# Patient Record
Sex: FEMALE | Race: WHITE | NOT HISPANIC OR LATINO | Employment: FULL TIME | URBAN - METROPOLITAN AREA
[De-identification: names, ages, dates, MRNs, and addresses within clinical notes are randomized per-mention and may not be internally consistent; named-entity substitution may affect disease eponyms.]

---

## 2016-02-17 LAB
HBV SURFACE AG SERPL QL IA: NEGATIVE
HIV 1+2 AB+HIV1 P24 AG SERPL QL IA: NEGATIVE
RUBELLA ANTIBODY IGG QUANTITATIVE: NORMAL IU/ML

## 2017-01-06 ASSESSMENT — ENCOUNTER SYMPTOMS
SPEECH CHANGE: 0
PARALYSIS: 0
LIGHT-HEADEDNESS: 1
FEVER: 0
SMELL DISTURBANCE: 0
WEIGHT GAIN: 0
INSOMNIA: 1
JOINT SWELLING: 0
HYPOTENSION: 0
BLOOD IN STOOL: 0
FATIGUE: 1
TREMORS: 0
POOR WOUND HEALING: 0
HEADACHES: 1
CONSTIPATION: 1
LEG PAIN: 0
POLYPHAGIA: 1
NECK MASS: 0
NIGHT SWEATS: 0
VOMITING: 0
LOSS OF CONSCIOUSNESS: 0
RECTAL PAIN: 0
SKIN CHANGES: 0
NAUSEA: 1
SINUS CONGESTION: 1
DISTURBANCES IN COORDINATION: 0
JAUNDICE: 0
DECREASED CONCENTRATION: 1
BOWEL INCONTINENCE: 0
SEIZURES: 0
HYPERTENSION: 0
HEARTBURN: 0
LEG SWELLING: 0
NUMBNESS: 0
TINGLING: 0
DIZZINESS: 1
SYNCOPE: 0
SLEEP DISTURBANCES DUE TO BREATHING: 0
ORTHOPNEA: 0
STIFFNESS: 0
CHILLS: 0
RECTAL BLEEDING: 0
MUSCLE WEAKNESS: 0
DECREASED APPETITE: 1
CLAUDICATION: 0
DEPRESSION: 1
EXERCISE INTOLERANCE: 0
NERVOUS/ANXIOUS: 1
WEIGHT LOSS: 1
BACK PAIN: 1
HALLUCINATIONS: 0
MYALGIAS: 0
SORE THROAT: 0
PANIC: 1
TACHYCARDIA: 0
BLOATING: 1
SINUS PAIN: 0
TASTE DISTURBANCE: 0
NAIL CHANGES: 0
MEMORY LOSS: 0
PALPITATIONS: 1
HOARSE VOICE: 0
ARTHRALGIAS: 1
WEAKNESS: 0
TROUBLE SWALLOWING: 0
NECK PAIN: 1
POLYDIPSIA: 1
INCREASED ENERGY: 1
DIARRHEA: 1
ABDOMINAL PAIN: 1
ALTERED TEMPERATURE REGULATION: 0
MUSCLE CRAMPS: 0

## 2017-01-06 ASSESSMENT — PATIENT HEALTH QUESTIONNAIRE - PHQ9
10. IF YOU CHECKED OFF ANY PROBLEMS, HOW DIFFICULT HAVE THESE PROBLEMS MADE IT FOR YOU TO DO YOUR WORK, TAKE CARE OF THINGS AT HOME, OR GET ALONG WITH OTHER PEOPLE: SOMEWHAT DIFFICULT
SUM OF ALL RESPONSES TO PHQ QUESTIONS 1-9: 14

## 2017-01-06 ASSESSMENT — ANXIETY QUESTIONNAIRES
GAD7 TOTAL SCORE: 17
GAD7 TOTAL SCORE: 17
7. FEELING AFRAID AS IF SOMETHING AWFUL MIGHT HAPPEN: 2 = MORE THAN HALF THE DAYS

## 2017-01-07 ASSESSMENT — ANXIETY QUESTIONNAIRES: GAD7 TOTAL SCORE: 17

## 2017-01-07 ASSESSMENT — PATIENT HEALTH QUESTIONNAIRE - PHQ9: SUM OF ALL RESPONSES TO PHQ QUESTIONS 1-9: 14

## 2017-01-16 ENCOUNTER — OFFICE VISIT (OUTPATIENT)
Dept: HEMATOLOGY | Facility: CLINIC | Age: 27
End: 2017-01-16
Attending: INTERNAL MEDICINE
Payer: COMMERCIAL

## 2017-01-16 VITALS
HEART RATE: 96 BPM | DIASTOLIC BLOOD PRESSURE: 75 MMHG | SYSTOLIC BLOOD PRESSURE: 115 MMHG | WEIGHT: 124.3 LBS | HEIGHT: 67 IN | TEMPERATURE: 98.3 F | BODY MASS INDEX: 19.51 KG/M2

## 2017-01-16 DIAGNOSIS — D68.59: Primary | ICD-10-CM

## 2017-01-16 DIAGNOSIS — D68.59: ICD-10-CM

## 2017-01-16 LAB
AT III ACT/NOR PPP CHRO: 92 % (ref 85–135)
HCG SERPL QL: POSITIVE

## 2017-01-16 PROCEDURE — 81240 F2 GENE: CPT | Performed by: INTERNAL MEDICINE

## 2017-01-16 PROCEDURE — 99213 OFFICE O/P EST LOW 20 MIN: CPT

## 2017-01-16 PROCEDURE — 99215 OFFICE O/P EST HI 40 MIN: CPT | Performed by: INTERNAL MEDICINE

## 2017-01-16 PROCEDURE — 85306 CLOT INHIBIT PROT S FREE: CPT | Performed by: INTERNAL MEDICINE

## 2017-01-16 PROCEDURE — 84703 CHORIONIC GONADOTROPIN ASSAY: CPT | Performed by: INTERNAL MEDICINE

## 2017-01-16 PROCEDURE — 85300 ANTITHROMBIN III ACTIVITY: CPT | Performed by: INTERNAL MEDICINE

## 2017-01-16 PROCEDURE — 36415 COLL VENOUS BLD VENIPUNCTURE: CPT | Performed by: INTERNAL MEDICINE

## 2017-01-16 ASSESSMENT — PAIN SCALES - GENERAL: PAINLEVEL: NO PAIN (0)

## 2017-01-16 NOTE — PATIENT INSTRUCTIONS
Your visit the [facility] was to discuss Protein C deficinecy.  You recent blood testing does confirm that you are heterozygous (meaning inherited a Protein C defect from 1 parent) for Protein C deficiency.   This means about half of the Protein C is functioning correctly in your blood.  This increases your risk for a blood clot and/or miscarriage but only to a small degree so we generally do not recommend blood thinners for this.  If you have inherited another tendency to form blood clots inherited from your father, then we would recommend considering a low-dose blood thinner during pregnancy.  We will call you the results of today's lab tests.

## 2017-01-16 NOTE — PROGRESS NOTES
[facility]  [address] [place] [address] [address], [address] [address]  Phone: [phone], Fax: [phone].      Outpatient Visit Note:    Patient: Ofelia Huddleston  MRN: [id number]  : 1990  GÓMEZ: 2017    Reason for Visit:  Protein C deficiency, never had a VTE    History of Present Illness:  Ofelia Huddleston is a 26 year old woman with a history of Protein C deficiency (carrier) who returns for routine follow up.  She was seen in this clinic in  for counseling about Protein C deficiency.  She was tested because a maternal aunt was found to have PC deficiency - though the reason for this testing is unclear.  Because of this test, her mother was then tested and found to be heterozygous PC deficient as well.  Her mother has never had a clot and Ofelia is unaware of fertility issues in mother.  Ofelia returns today feeling well.  Her protein C level was initially tested while OCPs and found to be 59%.  She is now off OCPs and had repeat testing in November and found to be 44%.  She also had FVLeiden testing which was negative.    She reports today that she is returning primarily because she is attempting to conceive.  She is feeling well today.  Today she brings in a lab report she got as part of testing for life insurance.  This show mild elevations of liver tests -  (ULN 33) and  (ULN 45).  She notes no abdominal fullness or pain.  She is requesting a serum pregnancy test today.    Past Medical History:  Past Medical History   Diagnosis Date     NO ACTIVE PROBLEMS (aka NONE)      Heterozygous protein C deficiency (H) 9/10/2014       Past Surgical History:  Past Surgical History   Procedure Laterality Date     Tonsillectomy         Medications:  Current Outpatient Prescriptions   Medication Sig Dispense Refill     Lactobacillus (PROBIOTIC ACIDOPHILUS) TABS        Pyridoxine HCl (VITAMIN B-6 PO)        UNABLE TO FIND MEDICATION NAME:  Chinese herbal supplements       Prenatal Vit-Fe Fumarate-FA (PRENATAL MULTIVITAMIN  PLUS IRON) 27-0.8 MG TABS Take 1 tablet by mouth daily       calcium-magnesium (CALMAG) 500-250 MG TABS Take 1 tablet by mouth daily       mometasone (NASONEX) 50 MCG/ACT nasal spray Spray 2 sprays into both nostrils daily 3 Box 3     loratadine (CLARITIN) 10 MG tablet Take 10 mg by mouth daily          Allergies:  Allergies   Allergen Reactions     Seasonal Allergies        ROS:  A 14 point ROS is negative except as stated in the HPI    Social History:  Denies any tobacco use. No significant alcohol use. Denies any illicit drug use. Patient works as an attoryney.    Family History:  As per HPI, mother has PC def (het) and maternal aunt does as well (het).    Objective:  Vitals: B/P: 115/75, T: 98.3, P: 96, R: Data Unavailable, Wt: 124 lbs 4.8 oz  Exam:   Gen: Appears well, no distress  HEENT: no scleral icterus or hemorrhage, no wet purpura, no lymphadenopathy  Abd: no fullness or distention  Ext: no edema    Labs:  As per HPI recent PC testing shows 44% activity    Imaging:  none    Assessment:  In summary, Ofelia Huddleston is a 26 year old woman with heterozygosity for Protein C deficiency, attempting to conceive.    Plan:  1. Test for other inherited thrombophilias  2. No anticoagulation right now but would consider prophylactic dose LMWH if she has co-inherited other thrombophilias.  3. Consider Mirena IUD or nonhormonal birth control post pregnancy.  4. Liver function test abnormalities - no further action, no related to protein C deficiency.    The patient is given our center's contact information and is instructed to call if she should have any further questions or concerns.  Otherwise, we will plan on seeing her back on an as-needed basis.      Total Time Spent:  I spent a total of 45 minutes face-to-face with Ofelia Huddleston during today's office visit.  Over 50% of this time was spent counseling the patient and/or  coordinating care regarding Protein C deficiency.      Elmo Paris MD   of Medicine  [facility] [employer]

## 2017-01-16 NOTE — LETTER
[facility]  [address] [place] [address], [facility] [address], [place] [address]  Phone: [phone], Fax: [phone].      To Whom it May Concern,        Ofelia Huddleston was seen at the [facility] for evaluation of risk for thrombosis during pregnancy.  As part of this evaluation she presented recent laboratory data indicating the presence of a mild elevations of liver tests.  Repeating those tests in my clinic, this incidental finding appears to be resolved.  Regardless, the cause for mildly elevated liver tests is not related to her inherited and generally thought to be mildly increased lifetime risk of venous thrombosis from heterozygosity of Protein C deficiency.         Signed,    Elmo Paris MD   of Medicine  [facility] [employer]

## 2017-01-19 LAB — PROT S FREE AG ACT/NOR PPP IA: 81 % (ref 55–125)

## 2017-01-20 ENCOUNTER — MYC MEDICAL ADVICE (OUTPATIENT)
Dept: FAMILY MEDICINE | Facility: CLINIC | Age: 27
End: 2017-01-20

## 2017-01-20 DIAGNOSIS — Z32.01 PREGNANCY TEST POSITIVE: Primary | ICD-10-CM

## 2017-01-20 DIAGNOSIS — N92.6 IRREGULAR MENSTRUAL CYCLE: ICD-10-CM

## 2017-01-20 LAB — COPATH REPORT: NORMAL

## 2017-01-20 NOTE — TELEPHONE ENCOUNTER
"Would advise patient put same request to OB.  Typically preferred that these type of labs are drawn from the same facility if need for repeat progesterone levels in the future and for better accuracy and follow up.  OB may be willing to du lab only prior to 8 week apt.  Thanks  Charissa \"Rafa\" EVERARDO Kaufman   "

## 2017-01-25 NOTE — NURSING NOTE
"Chief Complaint   Patient presents with     Consult     consult with protein C deficiency, tzimmer cma       Initial /75 mmHg  Pulse 96  Temp(Src) 98.3  F (36.8  C) (Oral)  Ht 1.702 m (5' 7\")  Wt 56.382 kg (124 lb 4.8 oz)  BMI 19.46 kg/m2 Estimated body mass index is 19.46 kg/(m^2) as calculated from the following:    Height as of this encounter: 1.702 m (5' 7\").    Weight as of this encounter: 56.382 kg (124 lb 4.8 oz).  BP completed using cuff size: regular    " none

## 2017-02-06 ENCOUNTER — MYC MEDICAL ADVICE (OUTPATIENT)
Dept: HEMATOLOGY | Facility: CLINIC | Age: 27
End: 2017-02-06

## 2017-02-07 ENCOUNTER — OFFICE VISIT (OUTPATIENT)
Dept: FAMILY MEDICINE | Facility: CLINIC | Age: 27
End: 2017-02-07
Payer: COMMERCIAL

## 2017-02-07 VITALS
HEIGHT: 67 IN | SYSTOLIC BLOOD PRESSURE: 96 MMHG | TEMPERATURE: 98 F | DIASTOLIC BLOOD PRESSURE: 50 MMHG | OXYGEN SATURATION: 99 % | BODY MASS INDEX: 19.85 KG/M2 | WEIGHT: 126.5 LBS | HEART RATE: 99 BPM

## 2017-02-07 DIAGNOSIS — J00 ACUTE NASOPHARYNGITIS: Primary | ICD-10-CM

## 2017-02-07 DIAGNOSIS — Z33.1 PREGNANCY, INCIDENTAL: ICD-10-CM

## 2017-02-07 PROCEDURE — 99213 OFFICE O/P EST LOW 20 MIN: CPT | Performed by: FAMILY MEDICINE

## 2017-02-07 NOTE — NURSING NOTE
"Chief Complaint   Patient presents with     URI       Initial BP 96/50 mmHg  Pulse 99  Temp(Src) 98  F (36.7  C) (Oral)  Ht 5' 7\" (1.702 m)  Wt 126 lb 8 oz (57.38 kg)  BMI 19.81 kg/m2  SpO2 99%  LMP 10/20/2016  Breastfeeding? Yes Estimated body mass index is 19.81 kg/(m^2) as calculated from the following:    Height as of this encounter: 5' 7\" (1.702 m).    Weight as of this encounter: 126 lb 8 oz (57.38 kg).  Medication Reconciliation: complete     Natasha Paris, SMA    "

## 2017-02-07 NOTE — PROGRESS NOTES
"  SUBJECTIVE:                                                    Ofelia Huddleston is a 26 year old female who presents to clinic today for the following health issues:      RESPIRATORY SYMPTOMS      Duration: a week and a half    Description  nasal congestion, rhinorrhea, chills, fatigue/malaise and green mucus     Severity: not sure    Accompanying signs and symptoms: None    History (predisposing factors):  Exposed at work, many coworkers    Precipitating or alleviating factors: None    Therapies tried and outcome:  nothing        Allergies   Allergen Reactions     Seasonal Allergies       Past Surgical History   Procedure Laterality Date     Tonsillectomy  2006     Past Medical History   Diagnosis Date     NO ACTIVE PROBLEMS (aka NONE)      Heterozygous protein C deficiency (H) 9/10/2014        \BP 96/50 mmHg  Pulse 99  Temp(Src) 98  F (36.7  C) (Oral)  Ht 5' 7\" (1.702 m)  Wt 126 lb 8 oz (57.38 kg)  BMI 19.81 kg/m2  SpO2 99%  LMP 10/20/2016  Breastfeeding? Yes   OBJECTIVE:  She appears tired vital signs are as noted by the nurse, normal. Ears normal.  Throat and pharynx normal.  Neck supple. No adenopathy in the neck. Nose is congested with clear drainage. Sinuses non tender. The chest is clear, without wheezes or rales.    ASSESSMENT:   Viral upper respiratory illness  Incidental pregnancy, she doesn't want to take any medications, discussed options, Please see patient instructions below.     PLAN:  Symptomatic therapy suggested: push fluids and rest. Call or return to clinic prn if these symptoms worsen or fail to improve as anticipated.   "

## 2017-02-07 NOTE — MR AVS SNAPSHOT
"              After Visit Summary   2/7/2017    Ofelia Huddleston    MRN: [id number]           Patient Information     Date Of Birth          1990        Visit Information        Provider Department      2/7/2017 3:20 PM Halina Luciano MD [facility]        Care Instructions    Vitamin C 250 - 500  Mg daily  Buckwheat honey  Hot toddies: half a lemon, tablespoon honey, hot water      B vitamins, hiwot can help nausea.        Follow-ups after your visit        Who to contact     If you have questions or need follow up information about today's clinic visit or your schedule please contact [facility] directly at [phone].  Normal or non-critical lab and imaging results will be communicated to you by MyChart, letter or phone within 4 business days after the clinic has received the results. If you do not hear from us within 7 days, please contact the clinic through [employer] or phone. If you have a critical or abnormal lab result, we will notify you by phone as soon as possible.  Submit refill requests through [employer]EDit or call your pharmacy and they will forward the refill request to us. Please allow 3 business days for your refill to be completed.          Additional Information About Your Visit        MyChart Information     SCHEDit gives you secure access to your electronic health record. If you see a primary care provider, you can also send messages to your care team and make appointments. If you have questions, please call your primary care clinic.  If you do not have a primary care provider, please call [phone] and they will assist you.        Care EveryWhere ID     This is your Care EveryWhere ID. This could be used by other organizations to access your [facility] medical records  [id number]        Your Vitals Were     Pulse Temperature Height BMI (Body Mass Index) Pulse Oximetry Last Period    99 98  F (36.7  C) (Oral) 5' 7\" (1.702 m) 19.81 kg/m2 99% 10/20/2016    " Breastfeeding?                   Yes            Blood Pressure from Last 3 Encounters:   02/07/17 96/50   01/16/17 115/75   11/10/16 100/58    Weight from Last 3 Encounters:   02/07/17 126 lb 8 oz (57.38 kg)   01/16/17 124 lb 4.8 oz (56.382 kg)   11/10/16 130 lb (58.968 kg)              Today, you had the following     No orders found for display       Primary Care Provider Office Phone # Fax #    Halina Luciano [phone]       [facility] [address]  [facility] [address]        Thank you!     Thank you for choosing [facility]  for your care. Our goal is always to provide you with excellent care. Hearing back from our patients is one way we can continue to improve our services. Please take a few minutes to complete the written survey that you may receive in the mail after your visit with us. Thank you!             Your Updated Medication List - Protect others around you: Learn how to safely use, store and throw away your medicines at www.disposemymeds.org.          This list is accurate as of: 2/7/17  4:05 PM.  Always use your most recent med list.                   Brand Name Dispense Instructions for use    calcium-magnesium 500-250 MG Tabs per tablet    CALMAG     Take 1 tablet by mouth daily       loratadine 10 MG tablet    CLARITIN     Take 10 mg by mouth daily       mometasone 50 MCG/ACT spray    NASONEX    3 Box    Spray 2 sprays into both nostrils daily       prenatal multivitamin  plus iron 27-0.8 MG Tabs per tablet      Take 1 tablet by mouth daily       PROBIOTIC ACIDOPHILUS Tabs          UNABLE TO FIND      MEDICATION NAME: Chinese herbal supplements       VITAMIN B-6 PO

## 2017-05-03 ENCOUNTER — THERAPY VISIT (OUTPATIENT)
Dept: PHYSICAL THERAPY | Facility: CLINIC | Age: 27
End: 2017-05-03
Payer: COMMERCIAL

## 2017-05-03 DIAGNOSIS — M54.50 ACUTE BILATERAL LOW BACK PAIN WITHOUT SCIATICA: Primary | ICD-10-CM

## 2017-05-03 DIAGNOSIS — Z33.1 PREGNANCY, INCIDENTAL: ICD-10-CM

## 2017-05-03 PROCEDURE — 97110 THERAPEUTIC EXERCISES: CPT | Mod: GP | Performed by: PHYSICAL THERAPIST

## 2017-05-03 PROCEDURE — 97161 PT EVAL LOW COMPLEX 20 MIN: CPT | Mod: GP | Performed by: PHYSICAL THERAPIST

## 2017-05-03 NOTE — LETTER
[facility]  [address]  [facility] [address]  [phone]    May 3, 2017    Re: Ofelia Huddleston   :   1990  MRN:  [id number]   REFERRING PHYSICIAN:   Hazel Cardoza    [facility]  Date of Initial Evaluation:  2017  Visits:  1 Rxs Used: 1  Reason for Referral:     Acute bilateral low back pain without sciatica  Pregnancy, incidental    EVALUATION SUMMARY  Subjective:  Patient is a 26 year old female presenting with rehab back hpi.   Ofelia Huddleston is a 26 year old female with a lumbar condition.  Condition occurred with:  Insidious onset.  Condition occurred: at home.  This is a new condition  Pt complains of LBP starting about 1 month ago (2017) for unknown reasons.  She is currently 19 weeks gestation, this is her 1st pregnancy and everything going well.  She has had LBP in past with sleeping, her job does require her to sit a lot ().  Sometimes complains of pain in L SIJ with walking but reports low back feels better with walking.  Denies any incontinence/bowel/bladder changes or weakness, denies any radicular symptoms..  Site of Pain: mid to low back, some SI pain/ L hip pain.    Pain is described as aching and sharp and is constant and reported as 5/10.  Associated symptoms:  Pregnancy. Pain is worse in the A.M. and worse in the P.M..  Symptoms are exacerbated by walking and sitting and relieved by activity/movement (got a support belt which helps a little).  Since onset symptoms are gradually worsening.    Previous treatment includes chiropractic (goes 1 x a week).  There was mild (adjustments) improvement following previous treatment.  General health as reported by patient is good.                Objective:  System  Lumbar/SI Evaluation  ROM:    AROM Lumbar:   Flexion:          Mod loss, decreased curve reversal  Ext:                    Mod loss   Side Bend:         Left:     Right:   Rotation:           Left:     Right:   Side Glide:        Left:  Mod loss    Right:  Mod loss  Lumbar Myotomes:  normal (does present with weakness in L glut/hip abduction 4/5 with min pain)  Lumbar DTR's:  normal  Cord Signs:  normal  Lumbar Dermtomes:  normal  Re: Ofelia Huddleston   :   1990    Lumbar Palpation:    Tenderness present at Left:    Piriformis and Gluteus Medius  Functional Tests:  Core strength and proprioception lumbar: Poor TrA activation with tendency to Valsalva.  SI joint/Sacrum:      Left positive at:    Squish and Thigh thrust  Left negative at:    Ilium Ventromedial  Assessment/Plan:    Patient is a 26 year old female with lumbar complaints.    Patient has the following significant findings with corresponding treatment plan.                Diagnosis 1:  LBP/pregnant  Pain -  manual therapy, self management, education and home program  Decreased ROM/flexibility - manual therapy, therapeutic exercise, therapeutic activity and home program  Decreased strength - therapeutic exercise, therapeutic activities and home program  Impaired muscle performance - neuro re-education and home program  Decreased function - therapeutic activities and home program  Impaired posture - neuro re-education, therapeutic activities and home program  Therapy Evaluation Codes:   1) History comprised of:   Personal factors that impact the plan of care:      None.    Comorbidity factors that impact the plan of care are:      Current pregnancy, Depression and Migraines/headaches.     Medications impacting care: None.  2) Examination of Body Systems comprised of:   Body structures and functions that impact the plan of care:      Lumbar spine.   Activity limitations that impact the plan of care are:      Sitting, Standing, Walking and Sleeping.  3) Clinical presentation characteristics are:   Stable/Uncomplicated.  4) Decision-Making    Low complexity using standardized patient assessment  instrument and/or measureable assessment of functional outcome.  Cumulative Therapy Evaluation is: Low complexity.  Previous and current functional limitations:  (See Goal Flow Sheet for this information)    Short term and Long term goals: (See Goal Flow Sheet for this information)   Communication ability:  Patient appears to be able to clearly communicate and understand verbal and written communication and follow directions correctly.  Treatment Explanation - The following has been discussed with the patient:   RX ordered/plan of care  Anticipated outcomes  Possible risks and side effects  This patient would benefit from PT intervention to resume normal activities.   Rehab potential is good.  Frequency:  1 X week, once daily  Re: Ofelia Huddleston   :   1990    Duration:  for 3 weeks tapering to 1 X a month over 2 months  Discharge Plan:  Achieve all LTG.  Independent in home treatment program.  Reach maximal therapeutic benefit.    Thank you for your referral.    INQUIRIES  Therapist:Jeb Oliver DPT  [facility]  [address] [place], [place] [address] [address]  Phone: [phone]  Fax: [phone]

## 2017-05-03 NOTE — PROGRESS NOTES
Subjective:    Patient is a 26 year old female presenting with rehab back hpi.   Ofelia Huddleston is a 26 year old female with a lumbar condition.  Condition occurred with:  Insidious onset.  Condition occurred: at home.  This is a new condition  Pt complains of LBP starting about 1 month ago (April 2017) for unknown reasons.  She is currently 19 weeks gestation, this is her 1st pregnancy and everything going well.  She has had LBP in past with sleeping, her job does require her to sit a lot ().  Sometimes complains of pain in L SIJ with walking but reports low back feels better with walking.  Denies any incontinence/bowel/bladder changes or weakness, denies any radicular symptoms..    Site of Pain: mid to low back, some SI pain/ L hip pain.    Pain is described as aching and sharp and is constant and reported as 5/10.  Associated symptoms:  Pregnancy. Pain is worse in the A.M. and worse in the P.M..  Symptoms are exacerbated by walking and sitting and relieved by activity/movement (got a support belt which helps a little).  Since onset symptoms are gradually worsening.    Previous treatment includes chiropractic (goes 1 x a week).  There was mild (adjustments) improvement following previous treatment.  General health as reported by patient is good.                                              Objective:    System         Lumbar/SI Evaluation  ROM:    AROM Lumbar:   Flexion:          Mod loss, decreased curve reversal  Ext:                    Mod loss   Side Bend:        Left:     Right:   Rotation:           Left:     Right:   Side Glide:        Left:  Mod loss    Right:  Mod loss          Lumbar Myotomes:  normal (does present with weakness in L glut/hip abduction 4/5 with min pain)            Lumbar DTR's:  normal      Cord Signs:  normal    Lumbar Dermtomes:  normal                  Lumbar Palpation:    Tenderness present at Left:    Piriformis and Gluteus Medius    Functional Tests:  Core strength and  proprioception lumbar: Poor TrA activation with tendency to Valsalva.            SI joint/Sacrum:        Left positive at:    Squish and Thigh thrust  Left negative at:    Ilium Ventromedial                                                   General     ROS    Assessment/Plan:      Patient is a 26 year old female with lumbar complaints.    Patient has the following significant findings with corresponding treatment plan.                Diagnosis 1:  LBP/pregnant  Pain -  manual therapy, self management, education and home program  Decreased ROM/flexibility - manual therapy, therapeutic exercise, therapeutic activity and home program  Decreased strength - therapeutic exercise, therapeutic activities and home program  Impaired muscle performance - neuro re-education and home program  Decreased function - therapeutic activities and home program  Impaired posture - neuro re-education, therapeutic activities and home program    Therapy Evaluation Codes:   1) History comprised of:   Personal factors that impact the plan of care:      None.    Comorbidity factors that impact the plan of care are:      Current pregnancy, Depression and Migraines/headaches.     Medications impacting care: None.  2) Examination of Body Systems comprised of:   Body structures and functions that impact the plan of care:      Lumbar spine.   Activity limitations that impact the plan of care are:      Sitting, Standing, Walking and Sleeping.  3) Clinical presentation characteristics are:   Stable/Uncomplicated.  4) Decision-Making    Low complexity using standardized patient assessment instrument and/or measureable assessment of functional outcome.  Cumulative Therapy Evaluation is: Low complexity.    Previous and current functional limitations:  (See Goal Flow Sheet for this information)    Short term and Long term goals: (See Goal Flow Sheet for this information)     Communication ability:  Patient appears to be able to clearly communicate and  understand verbal and written communication and follow directions correctly.  Treatment Explanation - The following has been discussed with the patient:   RX ordered/plan of care  Anticipated outcomes  Possible risks and side effects  This patient would benefit from PT intervention to resume normal activities.   Rehab potential is good.    Frequency:  1 X week, once daily  Duration:  for 3 weeks tapering to 1 X a month over 2 months  Discharge Plan:  Achieve all LTG.  Independent in home treatment program.  Reach maximal therapeutic benefit.    Please refer to the daily flowsheet for treatment today, total treatment time and time spent performing 1:1 timed codes.

## 2017-05-12 ENCOUNTER — THERAPY VISIT (OUTPATIENT)
Dept: PHYSICAL THERAPY | Facility: CLINIC | Age: 27
End: 2017-05-12
Payer: COMMERCIAL

## 2017-05-12 DIAGNOSIS — Z33.1 PREGNANCY, INCIDENTAL: ICD-10-CM

## 2017-05-12 DIAGNOSIS — M54.50 ACUTE BILATERAL LOW BACK PAIN WITHOUT SCIATICA: ICD-10-CM

## 2017-05-12 PROCEDURE — 97112 NEUROMUSCULAR REEDUCATION: CPT | Mod: GP | Performed by: PHYSICAL THERAPIST

## 2017-05-12 PROCEDURE — 97110 THERAPEUTIC EXERCISES: CPT | Mod: GP | Performed by: PHYSICAL THERAPIST

## 2017-05-15 ENCOUNTER — HOSPITAL ENCOUNTER (OUTPATIENT)
Facility: CLINIC | Age: 27
Discharge: HOME | End: 2017-05-15
Attending: OBSTETRICS & GYNECOLOGY | Admitting: OBSTETRICS & GYNECOLOGY
Payer: COMMERCIAL

## 2017-05-15 VITALS
WEIGHT: 132 LBS | TEMPERATURE: 97.9 F | DIASTOLIC BLOOD PRESSURE: 59 MMHG | BODY MASS INDEX: 20.72 KG/M2 | HEIGHT: 67 IN | SYSTOLIC BLOOD PRESSURE: 116 MMHG

## 2017-05-15 PROBLEM — Z34.90 PREGNANCY: Status: ACTIVE | Noted: 2017-05-15

## 2017-05-15 LAB — A1 MICROGLOB PLACENTAL VAG QL: NEGATIVE

## 2017-05-15 PROCEDURE — 99214 OFFICE O/P EST MOD 30 MIN: CPT

## 2017-05-15 PROCEDURE — 84112 EVAL AMNIOTIC FLUID PROTEIN: CPT | Performed by: OBSTETRICS & GYNECOLOGY

## 2017-05-15 PROCEDURE — 99214 OFFICE O/P EST MOD 30 MIN: CPT | Mod: 25

## 2017-05-15 NOTE — PLAN OF CARE
Primip 20+5 wks presents to [facility] at 1810, states she leaked a small amount of clear fluid on Saturday evening but has none since. Denies any cramping or UCs, denies any bleeding. [place] applied. Fht's 143 by Taylor. Pt states she has started to feel FM. No complications with this pregnancy so far. Amnisure obtained, no fluid seen. Discussed POC with pt and  who state understanding.

## 2017-05-15 NOTE — PLAN OF CARE
Amnisure negative, Dr. Thompson paged at 1845 and d/c order received. D/c instructions given to pt, call MD with any bleeding, LOF, decreased FM, contractions or cramping, or any other questions or concerns. Pt states understanding. D/c home walking at 1850.

## 2017-05-15 NOTE — IP AVS SNAPSHOT
MRN:[id number]                      After Visit Summary   5/15/2017    Ofelia Huddleston    MRN: [id number]           Thank you!     Thank you for choosing [facility] for your care. Our goal is always to provide you with excellent care. Hearing back from our patients is one way we can continue to improve our services. Please take a few minutes to complete the written survey that you may receive in the mail after you visit with us. Thank you!        Patient Information     Date Of Birth          1990        About your hospital stay     You were admitted on:  May 15, 2017 You last received care in the:  [facility]    You were discharged on:  May 15, 2017       Who to Call     For medical emergencies, please call 911.  For non-urgent questions about your medical care, please call your primary care provider or clinic, [phone]          Attending Provider     Provider Specialty    Teresa Thompson MD OB/Gyn       Primary Care Provider Office Phone # Fax #    Halina Luciano [phone]       [address] [address]        Your next 10 appointments already scheduled     May 17, 2017  8:20 AM CDT   [facility] For Women Only with Russ Do Scheamirah,    [facility] ([facility]  )    [address] [place] [address] [address]  [facility] [id number] [phone]            May 23, 2017  8:20 AM CDT   [facility] Women Only with Russ Do Benito, [facility] ([facility]  )    [address] [place] [address] [address]  [facility] [id number] [phone]              Further instructions from your care team       Discharge Instruction for Undelivered Patients      You were seen for: Membrane Assessment  We Consulted: Dr. Thompson  You had (Test or Medicine):Amnisure     Diet:   Drink 8 to 12 glasses of liquids (milk, juice, water) every day.  You may eat meals and snacks.    "  Activity:  Call your doctor or nurse midwife if your baby is moving less than usual.     Call your provider if you notice:  Swelling in your face or increased swelling in your hands or legs.  Headaches that are not relieved by Tylenol (acetaminophen).  Changes in your vision (blurring: seeing spots or stars.)  Nausea (sick to your stomach) and vomiting (throwing up).   Weight gain of 5 pounds or more per week.  Heartburn that doesn't go away.  Signs of bladder infection: pain when you urinate (use the toilet), need to go more often and more urgently.  The bag of sullivan (rupture of membranes) breaks, or you notice leaking in your underwear.  Bright red blood in your underwear.  Abdominal (lower belly) or stomach pain.  For first baby: Contractions (tightening) less than 5 minutes apart for one hour or more.  Second (plus) baby: Contractions (tightening) less than 10 minutes apart and getting stronger.  *If less than 34 weeks: Contractions (tightenings) more than 6 times in one hour.  Increase or change in vaginal discharge (note the color and amount)  Other:     Follow-up:  As scheduled in the clinic          Pending Results     Date and Time Order Name Status Description    5/15/2017 1812 Rupture of membranes by Amnisure In process             Admission Information     Date & Time Provider Department Dept. Phone    5/15/2017 Teresa Thompson MD [facility] [phone]      Your Vitals Were     Blood Pressure Temperature Height Weight Last Period BMI (Body Mass Index)    116/59 97.9  F (36.6  C) (Temporal) 1.702 m (5' 7\") 59.9 kg (132 lb) 10/20/2016 20.67 kg/m2      MyChart Information     [employer] gives you secure access to your electronic health record. If you see a primary care provider, you can also send messages to your care team and make appointments. If you have questions, please call your primary care clinic.  If you do not have a primary care provider, please call [phone] and they will " assist you.        Care EveryWhere ID     This is your Care EveryWhere ID. This could be used by other organizations to access your [facility] medical records  [id number]           Review of your medicines      UNREVIEWED medicines. Ask your doctor about these medicines        Dose / Directions    calcium-magnesium 500-250 MG Tabs per tablet   Commonly known as:  CALMAG   Used for:  Elevated LFTs, Loss of weight, Loss of appetite        Dose:  1 tablet   Take 1 tablet by mouth daily   Refills:  0       loratadine 10 MG tablet   Commonly known as:  CLARITIN        Dose:  10 mg   Take 10 mg by mouth daily   Refills:  0       mometasone 50 MCG/ACT spray   Commonly known as:  NASONEX   Used for:  Seasonal allergies        Dose:  2 spray   Spray 2 sprays into both nostrils daily   Quantity:  3 Box   Refills:  3       prenatal multivitamin  plus iron 27-0.8 MG Tabs per tablet   Used for:  Elevated LFTs, Loss of weight, Loss of appetite        Dose:  1 tablet   Take 1 tablet by mouth daily   Refills:  0       PROBIOTIC ACIDOPHILUS Tabs        Refills:  0       UNABLE TO FIND        MEDICATION NAME: Chinese herbal supplements   Refills:  0       VITAMIN B-6 PO        Refills:  0                Protect others around you: Learn how to safely use, store and throw away your medicines at www.disposemymeds.org.             Medication List: This is a list of all your medications and when to take them. Check marks below indicate your daily home schedule. Keep this list as a reference.      Medications           Morning Afternoon Evening Bedtime As Needed    calcium-magnesium 500-250 MG Tabs per tablet   Commonly known as:  CALMAG   Take 1 tablet by mouth daily                                loratadine 10 MG tablet   Commonly known as:  CLARITIN   Take 10 mg by mouth daily                                mometasone 50 MCG/ACT spray   Commonly known as:  NASONEX   Spray 2 sprays into both nostrils daily                                 prenatal multivitamin  plus iron 27-0.8 MG Tabs per tablet   Take 1 tablet by mouth daily                                PROBIOTIC ACIDOPHILUS Tabs                                UNABLE TO FIND   MEDICATION NAME: Chinese herbal supplements                                VITAMIN B-6 PO

## 2017-05-15 NOTE — IP AVS SNAPSHOT
[address], [address]    [place] [address]    Phone:  [phone]                                       After Visit Summary   5/15/2017    Ofelia Huddleston    MRN: [id number]           After Visit Summary Signature Page     I have received my discharge instructions, and my questions have been answered. I have discussed any challenges I see with this plan with the nurse or doctor.    ..........................................................................................................................................  Patient/Patient Representative Signature      ..........................................................................................................................................  Patient Representative Print Name and Relationship to Patient    ..................................................               ................................................  Date                                            Time    ..........................................................................................................................................  Reviewed by Signature/Title    ...................................................              ..............................................  Date                                                            Time

## 2017-05-15 NOTE — DISCHARGE INSTRUCTIONS
Discharge Instruction for Undelivered Patients      You were seen for: Membrane Assessment  We Consulted: Dr. Thompson  You had (Test or Medicine):Amnisure     Diet:   Drink 8 to 12 glasses of liquids (milk, juice, water) every day.  You may eat meals and snacks.     Activity:  Call your doctor or nurse midwife if your baby is moving less than usual.     Call your provider if you notice:  Swelling in your face or increased swelling in your hands or legs.  Headaches that are not relieved by Tylenol (acetaminophen).  Changes in your vision (blurring: seeing spots or stars.)  Nausea (sick to your stomach) and vomiting (throwing up).   Weight gain of 5 pounds or more per week.  Heartburn that doesn't go away.  Signs of bladder infection: pain when you urinate (use the toilet), need to go more often and more urgently.  The bag of sullivan (rupture of membranes) breaks, or you notice leaking in your underwear.  Bright red blood in your underwear.  Abdominal (lower belly) or stomach pain.  For first baby: Contractions (tightening) less than 5 minutes apart for one hour or more.  Second (plus) baby: Contractions (tightening) less than 10 minutes apart and getting stronger.  *If less than 34 weeks: Contractions (tightenings) more than 6 times in one hour.  Increase or change in vaginal discharge (note the color and amount)  Other:     Follow-up:  As scheduled in the clinic

## 2017-05-17 ENCOUNTER — THERAPY VISIT (OUTPATIENT)
Dept: PHYSICAL THERAPY | Facility: CLINIC | Age: 27
End: 2017-05-17
Payer: COMMERCIAL

## 2017-05-17 DIAGNOSIS — M54.50 ACUTE BILATERAL LOW BACK PAIN WITHOUT SCIATICA: ICD-10-CM

## 2017-05-17 DIAGNOSIS — Z33.1 PREGNANCY, INCIDENTAL: ICD-10-CM

## 2017-05-17 PROCEDURE — 97110 THERAPEUTIC EXERCISES: CPT | Mod: GP | Performed by: PHYSICAL THERAPIST

## 2017-05-17 PROCEDURE — 97140 MANUAL THERAPY 1/> REGIONS: CPT | Mod: GP | Performed by: PHYSICAL THERAPIST

## 2017-05-23 ENCOUNTER — THERAPY VISIT (OUTPATIENT)
Dept: PHYSICAL THERAPY | Facility: CLINIC | Age: 27
End: 2017-05-23
Payer: COMMERCIAL

## 2017-05-23 DIAGNOSIS — Z33.1 PREGNANCY, INCIDENTAL: ICD-10-CM

## 2017-05-23 DIAGNOSIS — M54.50 ACUTE BILATERAL LOW BACK PAIN WITHOUT SCIATICA: ICD-10-CM

## 2017-05-23 PROCEDURE — 97530 THERAPEUTIC ACTIVITIES: CPT | Mod: GP | Performed by: PHYSICAL THERAPIST

## 2017-05-23 PROCEDURE — 97110 THERAPEUTIC EXERCISES: CPT | Mod: GP | Performed by: PHYSICAL THERAPIST

## 2017-06-02 ENCOUNTER — THERAPY VISIT (OUTPATIENT)
Dept: PHYSICAL THERAPY | Facility: CLINIC | Age: 27
End: 2017-06-02
Payer: COMMERCIAL

## 2017-06-02 DIAGNOSIS — Z33.1 PREGNANCY, INCIDENTAL: ICD-10-CM

## 2017-06-02 DIAGNOSIS — M54.50 ACUTE BILATERAL LOW BACK PAIN WITHOUT SCIATICA: ICD-10-CM

## 2017-06-02 PROCEDURE — 97110 THERAPEUTIC EXERCISES: CPT | Mod: GP | Performed by: PHYSICAL THERAPIST

## 2017-06-02 PROCEDURE — 97530 THERAPEUTIC ACTIVITIES: CPT | Mod: GP | Performed by: PHYSICAL THERAPIST

## 2017-09-05 ENCOUNTER — HOSPITAL ENCOUNTER (INPATIENT)
Facility: CLINIC | Age: 27
LOS: 2 days | Discharge: HOME HEALTH | End: 2017-09-07
Attending: OBSTETRICS & GYNECOLOGY | Admitting: OBSTETRICS & GYNECOLOGY
Payer: COMMERCIAL

## 2017-09-05 LAB
ABO + RH BLD: NORMAL
ABO + RH BLD: NORMAL
AMPHETAMINES UR QL SCN: NEGATIVE
BLD GP AB SCN SERPL QL: NORMAL
BLOOD BANK CMNT PATIENT-IMP: NORMAL
CANNABINOIDS UR QL: NEGATIVE
COCAINE UR QL: NEGATIVE
GROUP B STREP PCR: NORMAL
HGB BLD-MCNC: 11.7 G/DL (ref 11.7–15.7)
OPIATES UR QL SCN: NEGATIVE
PCP UR QL SCN: NEGATIVE
SPECIMEN EXP DATE BLD: NORMAL

## 2017-09-05 PROCEDURE — 85018 HEMOGLOBIN: CPT | Performed by: OBSTETRICS & GYNECOLOGY

## 2017-09-05 PROCEDURE — 86780 TREPONEMA PALLIDUM: CPT | Performed by: OBSTETRICS & GYNECOLOGY

## 2017-09-05 PROCEDURE — 0HQ9XZZ REPAIR PERINEUM SKIN, EXTERNAL APPROACH: ICD-10-PCS | Performed by: OBSTETRICS & GYNECOLOGY

## 2017-09-05 PROCEDURE — 86901 BLOOD TYPING SEROLOGIC RH(D): CPT | Performed by: OBSTETRICS & GYNECOLOGY

## 2017-09-05 PROCEDURE — 80307 DRUG TEST PRSMV CHEM ANLYZR: CPT | Performed by: OBSTETRICS & GYNECOLOGY

## 2017-09-05 PROCEDURE — 12000029 ZZH R&B OB INTERMEDIATE

## 2017-09-05 PROCEDURE — 25000125 ZZHC RX 250

## 2017-09-05 PROCEDURE — 25000128 H RX IP 250 OP 636

## 2017-09-05 PROCEDURE — 86900 BLOOD TYPING SEROLOGIC ABO: CPT | Performed by: OBSTETRICS & GYNECOLOGY

## 2017-09-05 PROCEDURE — 86850 RBC ANTIBODY SCREEN: CPT | Performed by: OBSTETRICS & GYNECOLOGY

## 2017-09-05 PROCEDURE — 59414 DELIVER PLACENTA: CPT

## 2017-09-05 RX ORDER — OXYCODONE HYDROCHLORIDE 5 MG/1
5-10 TABLET ORAL
Status: DISCONTINUED | OUTPATIENT
Start: 2017-09-05 | End: 2017-09-07 | Stop reason: HOSPADM

## 2017-09-05 RX ORDER — OXYTOCIN 10 [USP'U]/ML
10 INJECTION, SOLUTION INTRAMUSCULAR; INTRAVENOUS
Status: COMPLETED | OUTPATIENT
Start: 2017-09-05 | End: 2017-09-05

## 2017-09-05 RX ORDER — HYDROCORTISONE 2.5 %
CREAM (GRAM) TOPICAL 3 TIMES DAILY PRN
Status: DISCONTINUED | OUTPATIENT
Start: 2017-09-05 | End: 2017-09-07 | Stop reason: HOSPADM

## 2017-09-05 RX ORDER — BISACODYL 10 MG
10 SUPPOSITORY, RECTAL RECTAL DAILY PRN
Status: DISCONTINUED | OUTPATIENT
Start: 2017-09-07 | End: 2017-09-07 | Stop reason: HOSPADM

## 2017-09-05 RX ORDER — LANOLIN 100 %
OINTMENT (GRAM) TOPICAL
Status: DISCONTINUED | OUTPATIENT
Start: 2017-09-05 | End: 2017-09-07 | Stop reason: HOSPADM

## 2017-09-05 RX ORDER — NALOXONE HYDROCHLORIDE 0.4 MG/ML
.1-.4 INJECTION, SOLUTION INTRAMUSCULAR; INTRAVENOUS; SUBCUTANEOUS
Status: DISCONTINUED | OUTPATIENT
Start: 2017-09-05 | End: 2017-09-05

## 2017-09-05 RX ORDER — METHYLERGONOVINE MALEATE 0.2 MG/ML
200 INJECTION INTRAVENOUS
Status: DISCONTINUED | OUTPATIENT
Start: 2017-09-05 | End: 2017-09-05

## 2017-09-05 RX ORDER — OXYTOCIN/0.9 % SODIUM CHLORIDE 30/500 ML
100 PLASTIC BAG, INJECTION (ML) INTRAVENOUS CONTINUOUS
Status: DISCONTINUED | OUTPATIENT
Start: 2017-09-05 | End: 2017-09-07 | Stop reason: HOSPADM

## 2017-09-05 RX ORDER — NALOXONE HYDROCHLORIDE 0.4 MG/ML
.1-.4 INJECTION, SOLUTION INTRAMUSCULAR; INTRAVENOUS; SUBCUTANEOUS
Status: DISCONTINUED | OUTPATIENT
Start: 2017-09-05 | End: 2017-09-07 | Stop reason: HOSPADM

## 2017-09-05 RX ORDER — IBUPROFEN 800 MG/1
800 TABLET, FILM COATED ORAL
Status: DISCONTINUED | OUTPATIENT
Start: 2017-09-05 | End: 2017-09-05

## 2017-09-05 RX ORDER — ONDANSETRON 2 MG/ML
4 INJECTION INTRAMUSCULAR; INTRAVENOUS EVERY 6 HOURS PRN
Status: DISCONTINUED | OUTPATIENT
Start: 2017-09-05 | End: 2017-09-05

## 2017-09-05 RX ORDER — OXYCODONE AND ACETAMINOPHEN 5; 325 MG/1; MG/1
1 TABLET ORAL
Status: DISCONTINUED | OUTPATIENT
Start: 2017-09-05 | End: 2017-09-05

## 2017-09-05 RX ORDER — OXYTOCIN 10 [USP'U]/ML
INJECTION, SOLUTION INTRAMUSCULAR; INTRAVENOUS
Status: COMPLETED
Start: 2017-09-05 | End: 2017-09-05

## 2017-09-05 RX ORDER — CARBOPROST TROMETHAMINE 250 UG/ML
250 INJECTION, SOLUTION INTRAMUSCULAR
Status: DISCONTINUED | OUTPATIENT
Start: 2017-09-05 | End: 2017-09-05

## 2017-09-05 RX ORDER — ACETAMINOPHEN 325 MG/1
650 TABLET ORAL EVERY 4 HOURS PRN
Status: DISCONTINUED | OUTPATIENT
Start: 2017-09-05 | End: 2017-09-07 | Stop reason: HOSPADM

## 2017-09-05 RX ORDER — LIDOCAINE HYDROCHLORIDE 10 MG/ML
INJECTION, SOLUTION INFILTRATION; PERINEURAL
Status: COMPLETED
Start: 2017-09-05 | End: 2017-09-05

## 2017-09-05 RX ORDER — ACETAMINOPHEN 325 MG/1
650 TABLET ORAL EVERY 4 HOURS PRN
Status: DISCONTINUED | OUTPATIENT
Start: 2017-09-05 | End: 2017-09-05

## 2017-09-05 RX ORDER — AMOXICILLIN 250 MG
1-2 CAPSULE ORAL 2 TIMES DAILY
Status: DISCONTINUED | OUTPATIENT
Start: 2017-09-05 | End: 2017-09-07 | Stop reason: HOSPADM

## 2017-09-05 RX ORDER — MISOPROSTOL 200 UG/1
400 TABLET ORAL
Status: DISCONTINUED | OUTPATIENT
Start: 2017-09-05 | End: 2017-09-07 | Stop reason: HOSPADM

## 2017-09-05 RX ORDER — OXYTOCIN/0.9 % SODIUM CHLORIDE 30/500 ML
340 PLASTIC BAG, INJECTION (ML) INTRAVENOUS CONTINUOUS PRN
Status: DISCONTINUED | OUTPATIENT
Start: 2017-09-05 | End: 2017-09-07 | Stop reason: HOSPADM

## 2017-09-05 RX ORDER — IBUPROFEN 400 MG/1
400-800 TABLET, FILM COATED ORAL EVERY 6 HOURS PRN
Status: DISCONTINUED | OUTPATIENT
Start: 2017-09-05 | End: 2017-09-07 | Stop reason: HOSPADM

## 2017-09-05 RX ORDER — OXYTOCIN/0.9 % SODIUM CHLORIDE 30/500 ML
100-340 PLASTIC BAG, INJECTION (ML) INTRAVENOUS CONTINUOUS PRN
Status: DISCONTINUED | OUTPATIENT
Start: 2017-09-05 | End: 2017-09-05

## 2017-09-05 RX ORDER — OXYTOCIN 10 [USP'U]/ML
10 INJECTION, SOLUTION INTRAMUSCULAR; INTRAVENOUS
Status: DISCONTINUED | OUTPATIENT
Start: 2017-09-05 | End: 2017-09-05

## 2017-09-05 RX ADMIN — OXYTOCIN 10 UNITS: 10 INJECTION, SOLUTION INTRAMUSCULAR; INTRAVENOUS at 10:35

## 2017-09-05 RX ADMIN — LIDOCAINE HYDROCHLORIDE: 10 INJECTION, SOLUTION INFILTRATION; PERINEURAL at 10:46

## 2017-09-05 NOTE — PLAN OF CARE
Data: Ofelia Huddleston transferred to 454 via wheelchair at 1335. Baby transferred via parent's arms.  Action: Receiving unit notified of transfer: Yes. Patient and family notified of room change. Report given to Chitra VELASQUEZ RN at 1345. Belongings sent to receiving unit. Accompanied by Registered Nurse. Oriented patient to surroundings. Call light within reach. ID bands double-checked with receiving RN.  Response: Patient tolerated transfer and is stable.

## 2017-09-05 NOTE — PLAN OF CARE
Patient arrived to L&D for post-delivery recovery after a home delivery.  36+6W gestation. IM pitocin administered. Fundus firm U/1 upon admission with small flow. History and physical obtained. Dr. Cardoza came to assess patient. Repaired a small periurethral tear. Continue to monitor standard post delivery care.

## 2017-09-05 NOTE — IP AVS SNAPSHOT
MRN:[id number]                      After Visit Summary   9/5/2017    Ofelia Huddleston    MRN: [id number]           Thank you!     Thank you for choosing [facility] for your care. Our goal is always to provide you with excellent care. Hearing back from our patients is one way we can continue to improve our services. Please take a few minutes to complete the written survey that you may receive in the mail after you visit. If you would like to speak to someone directly about your visit please contact Patient Relations at [phone]. Thank you!          Patient Information     Date Of Birth          1990        Designated Caregiver       Most Recent Value    Caregiver    Will someone help with your care after discharge? no      About your hospital stay     You were admitted on:  September 5, 2017 You last received care in the:  [facility]    You were discharged on:  September 7, 2017       Who to Call     For medical emergencies, please call 911.  For non-urgent questions about your medical care, please call your primary care provider or clinic, [phone]          Attending Provider     Provider Specialty    Hazel Cardoza MD OB/Gyn       Primary Care Provider Office Phone # Fax #    Halina Angélica Luciano [phone]      After Care Instructions     Activity       Review discharge instructions            Activity       Review discharge instructions            Diet       Resume previous diet            Diet       Resume previous diet            Discharge Instructions - Postpartum visit       Schedule postpartum visit with your provider and return to clinic in 6 weeks.            Discharge Instructions - Postpartum visit       Schedule postpartum visit with your provider and return to clinic in 6 weeks.                  Further instructions from your care team       Postpartum Vaginal Delivery Instructions    Follow up in 6 weeks post partum       Lactation  [phone]    Home care referral  [phone]    Activity       Ask family and friends for help when you need it.    Do not place anything in your vagina for 6 weeks.    You are not restricted on other activities, but take it easy for a few weeks to allow your body to recover from delivery.  You are able to do any activities you feel up to that point.    No driving until you have stopped taking your pain medications (usually two weeks after delivery).     Call your health care provider if you have any of these symptoms:       Increased pain, swelling, redness, or fluid around your stiches from an episiotomy or perineal tear.    A fever above 100.4 F (38 C) with or without chills when placing a thermometer under your tongue.    You soak a sanitary pad with blood within 1 hour, or you see blood clots larger than a golf ball.    Bleeding that lasts more than 6 weeks.    Vaginal discharge that smells bad.    Severe pain, cramping or tenderness in your lower belly area.    A need to urinate more frequently (use the toilet more often), more urgently (use the toilet very quickly), or it burns when you urinate.    Nausea and vomiting.    Redness, swelling or pain around a vein in your leg.    Problems breastfeeding or a red or painful area on your breast.    Chest pain and cough or are gasping for air.    Problems coping with sadness, anxiety, or depression.  If you have any concerns about hurting yourself or the baby, call your provider immediately.     You have questions or concerns after you return home.     Keep your hands clean:  Always wash your hands before touching your perineal area and stitches.  This helps reduce your risk of infection.  If your hands aren't dirty, you may use an alcohol hand-rub to clean your hands. Keep your nails clean and short.        Pending Results     No orders found from 9/3/2017 to 9/6/2017.            Admission Information     Date & Time Provider Department Dept.  "Phone    9/5/2017 Hazel Cardoza MD [facility] [phone]      Your Vitals Were     Blood Pressure Temperature Respirations Height Weight Last Period    113/60 98.4  F (36.9  C) (Oral) 16 1.702 m (5' 7\") 72.6 kg (160 lb) 10/20/2016    BMI (Body Mass Index)                   25.06 kg/m2           Turned [employer] Digital Information     [employer] gives you secure access to your electronic health record. If you see a primary care provider, you can also send messages to your care team and make appointments. If you have questions, please call your primary care clinic.  If you do not have a primary care provider, please call [phone] and they will assist you.        Care EveryWhere ID     This is your Care EveryWhere ID. This could be used by other organizations to access your [facility] medical records  [id number]        Equal Access to Services     BRENNEN HUERTA : Leno Pope, tony roberts, lynne goodman, jc andrade . So [facility] [phone].    ATENCIÓN: Si habla español, tiene a canales disposición servicios gratuitos de asistencia lingüística. Ira al [phone].    We comply with applicable federal civil rights laws and [place] laws. We do not discriminate on the basis of race, color, national origin, age, disability sex, sexual orientation or gender identity.               Review of your medicines      START taking        Dose / Directions    ibuprofen 400 MG tablet   Commonly known as:  ADVIL/MOTRIN        Dose:  400-800 mg   Take 1-2 tablets (400-800 mg) by mouth every 6 hours as needed for other (cramping)   Quantity:  120 tablet   Refills:  0         CONTINUE these medicines which have NOT CHANGED        Dose / Directions    calcium-magnesium 500-250 MG Tabs per tablet   Commonly known as:  CALMAG   Used for:  Elevated LFTs, Loss of weight, Loss of appetite        Dose:  1 tablet   Take 1 tablet by mouth daily   Refills:  0       loratadine 10 MG " tablet   Commonly known as:  CLARITIN        Dose:  10 mg   Take 10 mg by mouth daily   Refills:  0       mometasone 50 MCG/ACT spray   Commonly known as:  NASONEX   Used for:  Seasonal allergies        Dose:  2 spray   Spray 2 sprays into both nostrils daily   Quantity:  3 Box   Refills:  3       prenatal multivitamin plus iron 27-0.8 MG Tabs per tablet   Used for:  Elevated LFTs, Loss of weight, Loss of appetite        Dose:  1 tablet   Take 1 tablet by mouth daily   Refills:  0       PROBIOTIC ACIDOPHILUS Tabs        Refills:  0       VITAMIN B-6 PO        Refills:  0         STOP taking     UNABLE TO FIND                Where to get your medicines      Some of these will need a paper prescription and others can be bought over the counter. Ask your nurse if you have questions.     You don't need a prescription for these medications     ibuprofen 400 MG tablet                Protect others around you: Learn how to safely use, store and throw away your medicines at www.disposemymeds.org.             Medication List: This is a list of all your medications and when to take them. Check marks below indicate your daily home schedule. Keep this list as a reference.      Medications           Morning Afternoon Evening Bedtime As Needed    calcium-magnesium 500-250 MG Tabs per tablet   Commonly known as:  CALMAG   Take 1 tablet by mouth daily                                ibuprofen 400 MG tablet   Commonly known as:  ADVIL/MOTRIN   Take 1-2 tablets (400-800 mg) by mouth every 6 hours as needed for other (cramping)                                loratadine 10 MG tablet   Commonly known as:  CLARITIN   Take 10 mg by mouth daily                                mometasone 50 MCG/ACT spray   Commonly known as:  NASONEX   Spray 2 sprays into both nostrils daily                                prenatal multivitamin plus iron 27-0.8 MG Tabs per tablet   Take 1 tablet by mouth daily                                PROBIOTIC  "ACIDOPHILUS Tabs                                VITAMIN B-6 PO                                          More Information        The Breastfeeding Breast  Breastfeeding can seem mysterious at first. What s going on inside the breast? Where does the milk come from? Can the baby breathe OK? In fact, mothers and babies are naturally designed for breastfeeding. The picture below shows how you and your baby work together during breastfeeding.       Lobules are structures that produce and store milk.  Ducts are tubes that carry the milk to the nipple.  The baby s nose is flat, allowing easy breathing while breastfeeding.  The nipple has many small openings where milk comes out.  The areola provides oils to clean the nipple and help baby latch. During feeding, as much of the areola as possible should be in the baby s mouth. This helps the baby get milk out of your breast. It is also more comfortable than if the baby sucks on the nipple alone.  The tongue helps the baby suckle. You might even see the tip of it sticking out under the nipple while your baby nurses!   The right milk for the right time  As your baby grows, his needs change. And your body s milk changes to suit those needs. You produce 3 kinds of milk for your baby:    Colostrum is the first milk. It is thick and yellowish, which is why many people call it \"liquid gold.\" Colostrum provides all of the nutrients that your baby needs in the first days. It may not look like much, but it is all your baby needs during this time.    Transitional milk comes in 2 to 5 days after birth. It can look creamy, white, or yellow.    Mature milk begins in the second or third week after birth. It looks thinner or more watery. It can have a bluish tint. Levels of protein, fat, and antibodies in mature milk change as your baby s needs change.  Date Last Reviewed: 9/7/2015 2000-2017 [employer]. [address] [place] [address], [place] [address]. All rights reserved. This " information is not intended as a substitute for professional medical care. Always follow your healthcare professional's instructions.                Breastfeeding FAQs  How often should I nurse?  Feed your  whenever he or she show signs of hunger. A general guideline is to nurse around 8 to 12 times every 24 hours, or about every 2 to 3 hours. With time and patience, every mother-baby pair will develop their own schedule and feeding pattern.  How many months should I nurse?  The American College of Obstetricians and Gynecologists and the American Academy of Pediatrics both recommend that mothers start breastfeeding as soon as possible after birth. Both support  breastfeeding-only  for the first 6 months of life. At 6 months, your baby may slowly start having solid foods as well. But continue breastfeeding at least through the baby s first birthday. After the first birthday, you and your baby can stop or continue breastfeeding as long as you want it to happen.  Is my baby getting enough milk?  There are a few ways to check if your baby is getting enough milk.  Latching on  You know your baby is getting milk if you hear gulping and swallowing sounds, not just sucking. Look for your baby steadily moving his or her jaw open and closed as another sign of proper  latching on.   Urine output and stool frequency  You can also tell how much milk your baby is getting by keeping track of your baby s diapers. By the end of the first week of life:    Your baby should have about 1 wet diaper on day 1, and  2 wet diapers on day 2. This should increase each day by 1 more wet diaper, up to 6 wet diapers on day 6 and then about 6 wet diapers every day. The urine will be a pale yellow color, not dark yellow or orange. Wet diapers should stay around this amount as your  baby gets older.    Your baby should have 3 or 4 stools per day. It is not uncommon for a  baby to pass stool after each feeding. In the second  to fourth week of life, the number of stools can increase to about 5 per day. After 1 month, the number of stools usually lessens. It might be 1 or 2 a day. Some babies may have a day or days with no stool. In these cases, stool should be in larger amounts when it is passed.     Weight  It s normal for your baby to lose some weight during the first 3 to 4 days of life. A baby might lose up to 7% of his or her birth weight during this time. Then your baby should start gaining again. By the end of the second week, he or she will back to birth weight.  Does my baby need vitamins?  All  infants should get vitamin D supplements. Your baby s healthcare provider will prescribe them. This may be at least 400 international units (IU) a day. Your baby usually will not need any other supplements. When your baby is 6 months old, you may start to offer baby foods that contain iron.  What should I do if my breasts become swollen, tender, or sore?  These symptoms are most often because your breasts are too full of milk (engorged). You are making more milk than your baby is drinking. This may cause pain and make it harder for your baby to nurse. If this happens, try the following:    Keep nursing. This is a temporary condition. It gets better once you can get your baby to drink more milk. Be sure to breastfeed more often and let your baby feed until he or she is finished.    Express some milk before you breastfeed. Do this manually or with a breast pump. This will soften the darker area around the nipple (areola) so your baby can latch deeper to begin feeding.    Use a warm compress. This can be a towel or paper diaper soaked in warm water. Or take a warm shower before feeding. Some women have found that switching off between a cold compress and a warm one gives them relief. If you feel comfortable with this, you can try it too. If you use an ice pack, wrap it in a thin towel to protect your skin.    Take acetaminophen for  continued pain. The medicine is safe to take every now and then during breastfeeding.  If your nipples or breasts continue to hurt, call your healthcare provider. Nipple and breast problems need to be looked at and treated as soon as possible. But don t get discouraged and stop nursing.    When to seek medical advice  Unless your baby s healthcare provider advises otherwise, call the provider right away if your baby has any of the following:    Fever (see Fever and children, below)    Repeated vomiting    Does not appear to be alert, refuses to nurse, or is sleeping too much, such as through feedings    Has signs of dehydration. These include fewer wet diapers than normal or no urine for 8 hours, or the urine appears dark. Or your baby has no tears when crying,  sunken eyes,  or dry mouth.    Is not gaining weight or losing weight  Call your own healthcare provider right away if you:    Have a fever of 100.4 F (38 C) or higher, or as directed by your provider    Have redness, warmth, pain, or unusual discharge from your breasts    Have such painful nipples and breasts that you want to stop nursing    Have a hard lump in your breast    Have lower belly (abdominal) pain or cramping when you are not breastfeeding    Have pain or burning when you pass urine    Have unexpected vaginal bleeding or foul-smelling discharge  Fever and children  Always use a digital thermometer to check your child s temperature. Never use a mercury thermometer.  For infants and toddlers, be sure to use a rectal thermometer correctly. A rectal thermometer may accidentally poke a hole in (perforate) the rectum. It may also pass on germs from the stool. Always follow the product maker s directions for proper use. If you don t feel comfortable taking a rectal temperature, use another method. When you talk to your child s healthcare provider, tell him or her which method you used to take your child s temperature.  Here are guidelines for fever  temperature. Ear temperatures aren t accurate before 6 months of age. Don t take an oral temperature until your child is at least 4 years old.  Infant under 3 months old:    Ask your child s healthcare provider how you should take the temperature.    Rectal or forehead (temporal artery) temperature of 100.4 F (38 C) or higher, or as directed by the provider    Armpit temperature of 99 F (37.2 C) or higher, or as directed by the provider   Date Last Reviewed: 3/1/2017    1109-4990 [employer]. [address] [place] [address]. All rights reserved. This information is not intended as a substitute for professional medical care. Always follow your healthcare professional's instructions.                How to Breastfeed  Babies use their lips, gums, and tongue to take milk from the breast (suckle). Your baby is born with an instinct for suckling. But it takes time for you and your baby to learn how to breastfeed. There are steps you can take to support your baby s natural instincts.  Skin-to-skin  If possible, hold your baby bare against your skin (skin-to-skin) just after giving birth and for a few hours after. You can also continue to do this in the first few weeks after birth.   How often should I feed my baby?  Nurse your  8 to 12 times every 24 hours. Feed your baby whenever he or she shows signs of hunger. When your baby is hungry, he or she will appear more awake and might root. Rooting means turning his or her head toward you when you stroke your baby s cheek. Your baby might also make a sucking sound or suck on his or her hand. Crying is a late sign of hunger. If your baby is crying, it may be hard for him or her to calm down to breastfeed. Infants will often eat at irregular times. But feedings will usually become more regular over time. Sometimes your baby might eat several times in a row (cluster feeding) and then take a break.   If your baby seems sleepy or too fussy to  "nurse, undress him or her and place your baby bare against your skin. Don't keep your baby swaddled tightly. This may keep him or her too sleepy to feed.  Change which breast you offer first with each feeding. For example, if you started nursing on the right side with the last feeding, offer the left side first with this feeding. Always offer the other breast after your baby stops nursing on the first side.  Ask your baby's healthcare provider about waking the baby for feeding. You may need to wake your baby and offer to nurse if it has been 4 hours since your baby's last feeding.     Offering your breast  Hold your breast with your thumb on top and fingers underneath in a loose . Gently stroke your nipple on your baby s lower lip. When you see your baby open his or her mouth wide, quickly bring the baby to your breast.     Latching on  The way your baby connects with the breast is called the latch. When your baby attaches, you should see more of the darker skin around the nipple (areola) above the baby's upper lip than below the lower lip. The front of your baby's entire body should be touching you. Your baby's nose and chin should be against the breast.  Your baby's cheeks should be full and not sinking inward. You should be able to see your baby's lips. They should be slightly flared outward. As your baby suckles, his or her jaw should open wide. It should not be \"munching\" as if chewing. Listen for swallowing.  It should not hurt when your baby latches on and suckles. If it does, try releasing the latch and starting over.      Releasing the latch  Let your baby nurse until satisfied. In most cases, when your baby is finished nursing, he or she will let go on his or her own. This tells you that your baby is done feeding on that breast. But you may need to release the latch sooner if you feel pain or for some other reason. To do this, slip your finger into the corner of your baby's mouth. You should feel the " "suction break. Only when the seal is broken, move your baby off your breast. Don't take the baby off your breast until you've felt a decrease in suction.    Burping your baby   babies don't need to burp as much as bottle-fed babies. Bottles flow faster, and babies tend to swallow more air. Try to burp your baby after each breast:    Hold the baby at your upper chest or slightly over your shoulder. Gently rub or pat the baby s back.    Or hold the baby sitting up on your lap. Support your baby's head and chest in front and in back. Slowly rock your baby back and forth.    Don t worry if your baby doesn't burp. He or she may not need to.   Date Last Reviewed: 3/1/2017    9339-7041 [employer]. [address] [place] [address], [place] [address]. All rights reserved. This information is not intended as a substitute for professional medical care. Always follow your healthcare professional's instructions.                Holding Your Baby While Breastfeeding      \"Laid-back\" position     Comfort and position are the keys to successful breastfeeding. Learn how to position your baby correctly at the breast. Choose the hold that works best for both of you. You may need to change holds as your baby grows.     Always make sure your baby is tummy-to-tummy with you.    Laid-back  baby-led natural position  Lie back on a sofa, bed, or reclining chair so that your body is at a comfortable 45-degree angle, but not flat. This may be more comfortable than sitting up and leaning over a breastfeeding pillow.  Here are some tips:    Place your baby on his or her tummy on your chest. When your baby feels your body with the whole front of his or her body, it triggers his or her senses to find your nipple. Let your baby move over to the breast and latch on without your help. Your arms will make a \"nest\" around your baby.    When your baby attaches, make sure you see more areola above the upper lip than below the lower lip. " "This should help protect your nipples from soreness.  Other positions you can try       Cradle hold \"Football\" hold Side-lying hold   Cradle hold or  cross-cradle  hold  Here are some tips:    Sit upright. Make sure you have back support and that you are comfortable and relaxed. Raise your baby to breast height. Use a pillow under your baby s bottom. Put your baby on his or her side on the pillow so his or her tummy is touching your tummy. Use a chair with armrests for your arms.    Keep your knees level with your hips. Put a stool or pillow under your feet if needed.    Cradle your baby. Make sure your baby s body is well supported by your arm (cradle hold). Or use your hand to support the base of your baby's head and neck (cross-cradle hold).     Make sure your baby s body is facing and touching your body with your baby's head higher than his or her bottom. It is easier for your baby to swallow that way.   Football  hold  You can use the football hold to breastfeed two babies at once.  Here are some tips:    Place a pillow at your side. Lay the baby s bottom on the pillow so that your baby's bottom is lower than his or her head. Hold your baby's neck so that your fingers are below his or her ears.     Make sure your baby's body is on his or her side so the whole front of your baby's body is touching yours.    Tuck your baby s legs between your arm and body, as if you were clutching a football or purse at your side.  Side-lying hold  Here are some tips:    Stretch out on your side. Using pillows to support your head, neck, and back. Place your baby on his or her side facing you so that the front part of your baby's body is against your body.    Support your baby s head, neck, and back with your arm.    Let your baby find the nipple and attach without help.    Switch breasts. Gather your baby close to your chest. Then roll onto your other side to feed the same way from the other breast.    It is always possible to " fall asleep while nursing, so make sure you are in a safe place when you use this hold. Do not use a couch. Follow your healthcare provider's advice about a safe sleep environment for your baby.  Date Last Reviewed: 3/1/2017    9065-5792 [employer]. [address] [place] [address]. All rights reserved. This information is not intended as a substitute for professional medical care. Always follow your healthcare professional's instructions.                Common Questions About Breastfeeding  Here are answers to some questions new mothers often ask.  Is my baby getting enough milk?  When it comes to feeding your baby, what goes in must come out. You can tell how much milk your baby is getting by keeping track of the baby s diapers:    By the first 24 hours after birth: The baby should have 1 to 2 wet diapers and 1 to 2 soiled (poopy) diapers. The poop will be dark and tar-like (meconium.)    The second and third day after birth: The baby should have 3 to 4 wet diapers and 2 to 3 soiled diapers. The poop will be greenish brown (transitional stool).    After the first 4 or 5 days: The baby should have at least 5 to 6 wet diapers and at least 3 to 4 soiled diapers a day. The poop will be yellow and loose.  How can I tell when my baby s hungry?  Don t wait until your baby cries to feed her. Newborns should be nursed as soon as they show any hunger signs. These include:    Increased alertness or activity    Rooting reflex (nuzzling against your breast)    Smacking her lips or opening and closing her mouth    Sucking on her hand or fingers    Crying (late sign)  How often should I feed my baby?  Feed your baby as often and as long as she wants. Make sure you re nursing at least 8 to 12 times per day. Some of these feedings might be close together (cluster feeding), and then your baby might rest for several hours. Let your baby nurse as long as she would like; when she is done, she will stop  "swallowing, relax her hands and fall asleep. If your baby hasn't nursed in 4 hours, you may need to wake your baby and offer her your milk. Newborns tend to be very sleepy and sometimes will not wake to eat. If your baby doesn't seem interested in nursing, place her in just her diaper against your bare skin (skin to skin) and continue to offer her your milk. And, if your baby fusses when feeding, don't worry. Some babies get distracted easily. To calm your baby, choose a quiet place for feeding. It may also help if you breastfeed in the same place in your home each time. If your baby is crying, it may be difficult for her to latch on. Gently place your finger in her mouth to help her feel calm, and then offer her your milk again.  Will I spoil my baby?  Newborns can't be spoiled. When your baby needs comfort, food, or holding, she'll cry to let you know. When you respond to your baby's needs, you help her learn to trust you. This is a time to shower your baby with love and attend to her needs.  Why is my baby so hungry?  Babies eat a lot. Their stomachs are very small when they are born, and mother's milk is easily and quickly digested. This is even truer during a growth spurt. Growth spurts usually happen around 2 and 6 weeks of age. They happen again at 3 and 6 months. During these times, your baby will breastfeed more often. Don t be alarmed. Your baby will not need formula or supplements. You will make all the milk that your baby needs because milk production is a \"supply and demand\" situation (baby's demand will increase mom's supply).  Date Last Reviewed: 9/7/2015 2000-2017 [employer]. [address] [place] [address], [place] [address]. All rights reserved. This information is not intended as a substitute for professional medical care. Always follow your healthcare professional's instructions.                Nutrition While Breastfeeding  Do I need a special diet for breastfeeding?  You don't have to " eat a special diet to produce enough milk for your baby. Also, your milk will be of good quality for your baby regardless of what you eat. However, your body needs fuel to make breastmilk, so eat your fill of a variety of foods. Breastfeeding isn t an excuse to eat and drink everything you want, but it s not a reason to avoid favorite foods either.   Healthy diet for the new mother  A healthy diet is recommended for all women and offers many benefits to the new mother. Choosing a variety of healthy foods creates a pattern for the entire family, and each family member benefits. Women who are breastfeeding need about 500 extra calories per day. Some women might need more, while others might need less. When choosing foods, use the nutrition chart below as a guide.    Bread, cereal, rice, and pasta Vegetables Fruit   Milk, yogurt, and cheese Meat, poultry, fish,  dry beans, eggs, and nuts Fats, oils, and sweets  (use sparingly)   What s good for you?  Here are some things to do:    Breastfeeding women need to drink when they feel thirsty. There is no specific amount of water you need to drink to make enough milk.    Follow healthy eating guidelines.    Snack on fruit or low-fat dairy products if you re hungry between meals.    If your healthcare provider recommends it, keep taking prenatal vitamins.  What s not good for you?  Here are other things to consider:    Limit fatty foods and foods that are high in sugar (cookies, cakes).    Be aware that what enters your body may pass into your breastmilk. Limit caffeine. It is not just in coffee, but is also in cola, tea, and chocolate.    Talk with your healthcare provider before taking any medicines. It is important to let your healthcare provider know that you are nursing. Some medicines are not safe with breastfeeding.    Remember: alcohol, cigarettes, and drugs also affect your breastmilk and your baby. Talk with your healthcare provider.  Date Last Reviewed: 9/7/2015     8797-9364 [employer]. [address] [place] [address], [place] [address]. All rights reserved. This information is not intended as a substitute for professional medical care. Always follow your healthcare professional's instructions.                The Benefits of Breastmilk  Breastmilk is the best food for your baby. It has just the right amount of nutrients. It also protects your baby's digestive system and other body systems. And it helps these systems develop.     Healthiest for baby  Breastmilk is the ideal food for babies. It has all the nutrients your baby needs to grow healthy and strong. Here are some of the many benefits for your baby:    Breastfeeding gives contact that your baby loves. Spending time skin-to-skin with you is calming and comforting.    Breastmilk lowers the risk for sudden infant death syndrome (SIDS).    Babies who are solely  have a lower risk for ear infections in their first year than formula-fed babies.    Breastmilk has DHA. This is a fat that helps your baby s developing brain, nervous system, and eyes.     Breastmilk is full of antibodies. These help your baby fight infection.    Breastmilk lowers your baby's risk for respiratory illnesses, ear infections, and diarrhea.    Breastfeeding lowers your baby s risk for allergies, colds, and many other diseases. Formula-fed babies are more likely to have an allergy to cow's milk.    Breastmilk changes as your baby grows. This meets your baby's changing needs.    Breastfeeding all of the time for the first 6 months gives your baby more of these benefits.    Breastfeeding plus solid food from 6 months to 1 year or more gives more benefits.     babies have fewer long-term health problems when they grow up. These problems include diabetes and obesity.  Healthiest for mom  For many women, breastfeeding is an amazing experience. It creates a strong bond between mother and baby. Women who breastfeed also get health  benefits. Other benefits for you include:    You can feel good knowing that your baby is growing healthy and strong because of your milk.    Breastmilk is convenient. It's free, clean, and always at the right temperature.    Breastfeeding burns calories. This can help you lose pregnancy weight faster.    Breastfeeding releases hormones that contract the uterus. This helps the uterus return to its normal size after childbirth.    Mothers who breastfeed have a decreased risk for ovarian and breast cancers.  Many people can help you learn to breastfeed. A lactation consultant is a healthcare provider specially trained to help moms breastfeed. Your nurse, midwife, nurse practitioner, obstetrician, pediatrician, or family practice doctor can also help you learn about breastfeeding.  What does breastfeeding all of the time mean?  Breastfeeding all of the time (exclusively) for at least the first 6 months of life is best for your baby. This means your baby should get only breastmilk. It can be expressed and fed to your baby in a bottle, as needed.  You should not give your baby water, sugar water, formula, or solids during his or her first 6 months unless your baby's healthcare provider tells you to.  Your baby s provider may also tell you to give your baby vitamins, minerals, or medicines.  babies should get extra vitamin D. Your baby's provider will tell you about the type and amount of vitamin D you should give your baby.  What are the risks of not breastfeeding all of the time?  You know about many of the benefits of breastfeeding. But you might not know why it is important to breastfeed solely for at least 6 months.  Your baby gets the best protection against health problems when he or she gets only breastmilk. Breastfeeding some of the time is good. But breastfeeding all of the time is best.  Giving your baby formula or other liquids may cause you to:    Have more problems breastfeeding    Make less  milk    Be less confident in breastfeeding    Breastfeed less often    Stop breastfeeding before your baby is at least 6 months old                                                     Who should not breastfeed all of the time?  Breastfeeding all of the time is almost always the best thing to do. But your healthcare provider may have reasons to recommend giving your baby formula or other liquids. They include:    Your baby has certain health problems. There are cases where you may need to add formula or other liquids, often only for a short time. This may be the case if your baby has low blood sugar (hypoglycemia), loses body fluids (dehydration), or has high levels of bilirubin.    You have certain health problems. Some infections can be passed from your skin to your baby's skin or through your breastmilk. So women with HIV/AIDS or untreated and contagious TB (tuberculosis) should not breastfeed. Women with active skin sores from chickenpox (varicella) or other skin sores can pump their breastmilk and feed their baby. But they should keep their baby s skin from touching any of the sores.    You use illegal drugs or drink alcohol. Women who use illegal drugs should not breastfeed. Women who drink alcohol should have their last drink 2 or more hours before breastfeeding, or drink right after breastfeeding.    You take certain prescribed medicines. If you take prescribed medicine, ask your baby s healthcare provider if you can breastfeed.  Date Last Reviewed: 3/1/2017    5058-1844 [employer]. [address] [place] [address], [place] [address]. All rights reserved. This information is not intended as a substitute for professional medical care. Always follow your healthcare professional's instructions.                Storing Expressed Milk  You can express your milk and store it in clean containers. Your family or a sitter can feed it to the baby. This way, your baby gets the benefits of your milk even when you  can't be there at feeding time.   Type of storage Storage times   Room temperature       At room temperature (up to 78 F or 26 C)    Tip: Keep the container clean, covered, and cool. 3 to 4 hours is best; 6 to 8 hours is acceptable under very clean conditions   Refrigerator       In a refrigerator (less than 39 F or less than 4 C)    Tip: Place milk in the back of the main section of the refrigerator. 72 hours is best; up to 8 days is acceptable under very clean conditions   Freezer        In a freezer (0 F or -17 C)    Tip: Store milk toward the back of the freezer. 6 months is best; 12 months is acceptable   Guidelines for milk storage  Always use a clean container to collect and store milk. Never pour warm expressed milk into a bottle with cold milk. And be sure to label and date each bottle or bag of milk. To store milk safely, see the chart above.  Warming stored milk    Thaw frozen milk in the refrigerator or in a bowl of warm water. It s a good idea to warm refrigerated milk before using it. For your baby s safety:    Use the oldest milk first    Warm a container of milk by putting it in a bowl of warm (not hot) water for a few minutes. Or use a bottle warmer set on low.    Gently swirl the milk to mix it. Then place a few drops on your wrist. The milk should be near room temperature.    Don t put the milk in a microwave. This could create pockets of hot liquid that can burn your baby s mouth.  Date Last Reviewed: 3/1/2017    4430-2044 [employer]. [address] [place] [address], [place] [address]. All rights reserved. This information is not intended as a substitute for professional medical care. Always follow your healthcare professional's instructions.

## 2017-09-05 NOTE — PROGRESS NOTES
Patient has remained stable since transfer. Bonding well with . Attempted breastfeeding. No latch. Hand expressed drops of colostrum.

## 2017-09-05 NOTE — L&D DELIVERY NOTE
HISTORY:  Kd Bonner is a 27-year-old G1, P0 at 36+6 weeks gestation who presented to Labor and Delivery via ambulance after delivery at home.  The patient states her labor started at approximately 4:00 a.m. and progressed very quickly.  Her  called an ambulance and 5 minutes after they arrived, the baby was delivered at home.  The patient and  were transported to the hospital, both doing well.  The placenta also delivered at home prior to transport to the hospital.  Please see the patient's admission history and physical for further details.      On arrival to Labor and Delivery, the baby boy was doing well.  The placenta had been delivered and was evaluated.  It did appear to be intact with a 3-vessel cord.  The examination of the perineum revealed a left small periurethral laceration, which was hemostatic and not repaired.  There was also a larger right periurethral and labial laceration, which was bleeding.  This was infiltrated with 10 cc of 1% lidocaine and repaired with 4-0 Vicryl in a running fashion.  There were no other lacerations present.      Mom and baby are stable in the labor suite at this time and routine postpartum care will be provided.         SHADY WANG MD             D: 2017 12:22   T: 2017 12:43   MT: SANDIP#[id number]      Name:     KD BONNER   MRN:      [id number]        Account:        [id number]   :      1990           Delivery Date:  2017      Document: [id number]       cc: [facility]

## 2017-09-05 NOTE — H&P
"OB Brief Admit H&P    No significant change in general health status based on examination of the patient, review of Nursing Admission Database and prenatal record.    Pt is a 27 year old  @ 36w6d who presented to L&D via ambulance after delivery at home.  Baby delivered by paramedic.  Placenta delivered at home also.  Patient and  transported to hospital doing well.    Patient's prenatal course has been complicated by:  1. Protein C deficiency - followed by Dr. Paris.  No prophylaxis recommended.  2. H/O anxiety, depression, OCP - no meds currently.  3. Rubella equivocal    Prenatal Labs:    Blood type A+  Rubella equivocal/HepBSAg neg/RPR neg/HIV neg  GCT 83  GBS not yet done      /74  Temp 98.5  F (36.9  C) (Oral)  Resp 18  Ht 1.702 m (5' 7\")  Wt 72.6 kg (160 lb)  LMP 10/20/2016  BMI 25.06 kg/m2   Abd- fundus firm at U  Pelvic - no perineal laceration.  Small left periurethral laceration - hemostatic and not repaired. Right labia/periurethral laceration repaired with 4-0 vicryl in running fashion.  See dictation.       Assessment:  27 year old  @ 36w6d admitted after delivery this AM at home.    Plan:  1. Admit to labor and delivery   2. Routine PP cares.    Hazel Cardoza  2017  12:06 PM      "

## 2017-09-05 NOTE — IP AVS SNAPSHOT
[facility]    [address]    [facility] [address]    Phone:  [phone]    Fax:  [phone]                                       After Visit Summary   9/5/2017    Ofelia Huddleston    MRN: [id number]           After Visit Summary Signature Page     I have received my discharge instructions, and my questions have been answered. I have discussed any challenges I see with this plan with the nurse or doctor.    ..........................................................................................................................................  Patient/Patient Representative Signature      ..........................................................................................................................................  Patient Representative Print Name and Relationship to Patient    ..................................................               ................................................  Date                                            Time    ..........................................................................................................................................  Reviewed by Signature/Title    ...................................................              ..............................................  Date                                                            Time

## 2017-09-06 LAB
HGB BLD-MCNC: 10.8 G/DL (ref 11.7–15.7)
T PALLIDUM IGG+IGM SER QL: NEGATIVE

## 2017-09-06 PROCEDURE — 12000027 ZZH R&B OB

## 2017-09-06 PROCEDURE — 85018 HEMOGLOBIN: CPT | Performed by: OBSTETRICS & GYNECOLOGY

## 2017-09-06 PROCEDURE — 36415 COLL VENOUS BLD VENIPUNCTURE: CPT | Performed by: OBSTETRICS & GYNECOLOGY

## 2017-09-06 NOTE — PROGRESS NOTES
"[facility]   Post-Partum Progress Note        Interval History:   Doing well.  Pain is adequately controlled.  No fevers.  No history of foul-smelling vaginal discharge.  Good appetite.  Denies chest pain, shortness of breath, nausea or vomiting.  Vaginal bleeding is similar to a heavy menstrual flow.  Baby has good latch but weak suck. They are working on this.            Medications:   All medications related to the patient's surgery have been reviewed          Physical Exam:   All vitals stable  Blood pressure 109/63, temperature 98.1  F (36.7  C), temperature source Oral, resp. rate 18, height 1.702 m (5' 7\"), weight 72.6 kg (160 lb), last menstrual period 10/20/2016, unknown if currently breastfeeding.  Exam deferred.           Data:     Hemoglobin   Date Value Ref Range Status   2017 10.8 (L) 11.7 - 15.7 g/dL Final   2017 11.7 11.7 - 15.7 g/dL Final   11/10/2016 14.7 11.7 - 15.7 g/dL Final   2014 13.0 11.7 - 15.7 g/dL Final            Assessment and Plan:    Assessment:   Post-partum day #1  Normal spontaneous vaginal delivery  :     Precipitous,  delivery. GBS unknown.   Plan:   Anticipate discharge tomorrow     Esther Mejia CNM  "

## 2017-09-06 NOTE — PLAN OF CARE
Problem: Goal Outcome Summary  Goal: Goal Outcome Summary  Outcome: Improving  Patient stable. Up ambulating in room. Voiding without difficulty. Tolerating regular diet. Using ice and tucks for discomfort. Started pumping and hand expressing this shift with good results. Continue to monitor.

## 2017-09-06 NOTE — PLAN OF CARE
Problem: Goal Outcome Summary  Goal: Goal Outcome Summary  Outcome: No Change  Breast feeding with some assistance. Hand expressing and pumping also. Denies need for pain medication.  at bedside and supportive. Monitor.

## 2017-09-06 NOTE — PLAN OF CARE
Problem: Goal Outcome Summary  Goal: Goal Outcome Summary  Outcome: Improving  Patient independent with self and infant cares, voiding. Patient breastfeeding well, latch of 10, pumping post feeds and finger feeding EBM to infant. Patient denies pain, declined pain interventions this shift. Working on d/c items, watched hand expression video.  and family at bedside and supportive.

## 2017-09-07 VITALS
WEIGHT: 160 LBS | RESPIRATION RATE: 16 BRPM | TEMPERATURE: 98.4 F | HEIGHT: 67 IN | SYSTOLIC BLOOD PRESSURE: 113 MMHG | DIASTOLIC BLOOD PRESSURE: 60 MMHG | BODY MASS INDEX: 25.11 KG/M2

## 2017-09-07 PROCEDURE — 25000128 H RX IP 250 OP 636: Performed by: OBSTETRICS & GYNECOLOGY

## 2017-09-07 PROCEDURE — 90707 MMR VACCINE SC: CPT | Performed by: OBSTETRICS & GYNECOLOGY

## 2017-09-07 PROCEDURE — 40000084 ZZH STATISTIC IP LACTATION SERVICES 16-30 MIN

## 2017-09-07 RX ORDER — IBUPROFEN 400 MG/1
400-800 TABLET, FILM COATED ORAL EVERY 6 HOURS PRN
Qty: 120 TABLET | Refills: 0 | COMMUNITY
Start: 2017-09-07 | End: 2019-01-09

## 2017-09-07 RX ADMIN — MEASLES, MUMPS, AND RUBELLA VIRUS VACCINE LIVE 0.5 ML: 1000; 12500; 1000 INJECTION, POWDER, LYOPHILIZED, FOR SUSPENSION SUBCUTANEOUS at 09:52

## 2017-09-07 NOTE — PROGRESS NOTES
"OB Post-partum Note  PPD# 2    S:  Patient doing well.  Pain controlled, not using any medication.  Voiding.  Bleeding normal.  Breast feeding.    O:  /72  Temp 98.1  F (36.7  C) (Oral)  Resp 16  Ht 1.702 m (5' 7\")  Wt 72.6 kg (160 lb)  LMP 10/20/2016  Breastfeeding? Unknown  BMI 25.06 kg/m2  Gen- A&O, NAD  Abd- Non-tender, fundus firm at umbilicus  Ext- non-tender, no edema    Hemoglobin   Date Value Ref Range Status   2017 10.8 (L) 11.7 - 15.7 g/dL Final     A+  Rubella Immune    A/P: 27 year old  PPD# 2 s/p precipitous delivery at 36+6 at home, GBS unknown.    1.  Routine post-partum cares.  2.  Anticipate d/c home on PPD# 2.    Anya Pearson MD  2017  8:59 AM  "

## 2017-09-07 NOTE — CONSULTS
"D) CAESAR responding to MD referral, met with Ofelia and David who are  and reside in [place].  baby Lee is their first and they are prepared for him at home and are not on WIC. Ofelia scored 13 on EPDS and answered \"hardly ever\" to thoughts of harm. Ofelia has a history of depression and anxiety with panic attacks. She has felt better during the pregnancy and her panic attacks diminished significantly. Her s/s of depression are withdrawn, flat and lethargy. She has seen the same therapist for 5 years and will see him in 2 weeks. She is not currently taking medications for this due to the pregnancy however she plans to speak with her doctor about starting something. She is aware of her s/s and will contact her doctor as needed. Ofelia and David are interested in community supports available to them as new parents.  I) CAESAR discussed baby blues/postpartum depression and gave information on this. CAESAR aso gave Parent Resource Guide with SW contact information. SW gave Sheila parenting information as well as ECFE resources and developmental wheel. CAESAR also discussed support groups available for moms with postpartum depression..   A) Ofelia is A&O with appropriate affect and good eye contact. She is holding/bonding well with baby. She has good insight into her mental health concerns and well connected with mental health resources. She denies risk of harm. She is looking forward to community connections with other parents. David is at bedside holding/bonding well with baby. Extended family are present, reside nearby and are available for support.  P) No further d/c needs at this time. SW available upon request  "

## 2017-09-07 NOTE — PLAN OF CARE
Problem: Goal Outcome Summary  Goal: Goal Outcome Summary  Outcome: Improving  Patient stable. Up ambulating in room. Denies need for pain medication. Breastfeeding and pumping. Voiding without problems.

## 2017-09-07 NOTE — DISCHARGE INSTRUCTIONS
Postpartum Vaginal Delivery Instructions    Follow up in 6 weeks post partum      Lactation  [phone]    Home care referral  [phone]    Activity       Ask family and friends for help when you need it.    Do not place anything in your vagina for 6 weeks.    You are not restricted on other activities, but take it easy for a few weeks to allow your body to recover from delivery.  You are able to do any activities you feel up to that point.    No driving until you have stopped taking your pain medications (usually two weeks after delivery).     Call your health care provider if you have any of these symptoms:       Increased pain, swelling, redness, or fluid around your stiches from an episiotomy or perineal tear.    A fever above 100.4 F (38 C) with or without chills when placing a thermometer under your tongue.    You soak a sanitary pad with blood within 1 hour, or you see blood clots larger than a golf ball.    Bleeding that lasts more than 6 weeks.    Vaginal discharge that smells bad.    Severe pain, cramping or tenderness in your lower belly area.    A need to urinate more frequently (use the toilet more often), more urgently (use the toilet very quickly), or it burns when you urinate.    Nausea and vomiting.    Redness, swelling or pain around a vein in your leg.    Problems breastfeeding or a red or painful area on your breast.    Chest pain and cough or are gasping for air.    Problems coping with sadness, anxiety, or depression.  If you have any concerns about hurting yourself or the baby, call your provider immediately.     You have questions or concerns after you return home.     Keep your hands clean:  Always wash your hands before touching your perineal area and stitches.  This helps reduce your risk of infection.  If your hands aren't dirty, you may use an alcohol hand-rub to clean your hands. Keep your nails clean and short.

## 2017-09-07 NOTE — PLAN OF CARE
Problem: Goal Outcome Summary  Goal: Goal Outcome Summary  Outcome: No Change  Breast feeding has improved per patient. Has been more independent this shift. Denies need for pain medication this shift. Plan to discharge home today with baby.

## 2017-09-07 NOTE — PLAN OF CARE
Pt discharging to home in stable condition.  Pt knows to follow up in clinic at 6 weeks.  Pt will be taking over the counter medications for pain and pt has  Breast pump at home already.  Home care faxed and verified that home care that they will see pt on Saturday.   Pt has contact information for home care.  Pt scored a 13 on ppd scale.  Waiting for  to give resources prior to discharge.  Pt has a therapist and meets with them every 2 weeks.  Spouse is supportive.  Pt will be talking with therapist in next 2 weeks for medication options.

## 2017-09-07 NOTE — PLAN OF CARE
Data: Vital signs within normal limits. Postpartum checks within normal limits - see flow record. Patient eating and drinking normally. Patient able to empty bladder independently and is up ambulating. No apparent signs of infection. Episiotomy healing well. Patient performing self cares and is able to care for infant.  Action: Patient stated she was comfortable. Patient education done about discharge meds, importance of follow appointment and discharge meds. See flow record.  Response: Positive attachment behaviors observed with infant. Support persons  present.   Plan: Discharged today at 1310. Ambulated off unit with  and

## 2017-09-08 NOTE — LACTATION NOTE
Lactation visit. This is mom's 1st baby. She delivered at home at 36 weeks/6 days. This baby is doing very well at the breast. Glucoses were a little low but have now stabilized by d/c. Her breasts are filling, she is pumping pc and getting 5 cc now. Dad is either placing that milk under the shield or finger feeds after mom nurses. They will continue this plan until the return to the Ped and baby is gaining wt. After the weights stabilize, also discussed pumping for LPT baby to ensure best long term milk supply 1-3 x/day until close to due date.  Error

## 2017-11-11 ENCOUNTER — HEALTH MAINTENANCE LETTER (OUTPATIENT)
Age: 27
End: 2017-11-11

## 2019-01-09 ENCOUNTER — OFFICE VISIT (OUTPATIENT)
Dept: FAMILY MEDICINE | Facility: CLINIC | Age: 29
End: 2019-01-09
Payer: COMMERCIAL

## 2019-01-09 ENCOUNTER — TELEPHONE (OUTPATIENT)
Dept: FAMILY MEDICINE | Facility: CLINIC | Age: 29
End: 2019-01-09

## 2019-01-09 VITALS
TEMPERATURE: 98.1 F | WEIGHT: 129 LBS | SYSTOLIC BLOOD PRESSURE: 111 MMHG | BODY MASS INDEX: 20.2 KG/M2 | HEART RATE: 94 BPM | OXYGEN SATURATION: 99 % | DIASTOLIC BLOOD PRESSURE: 67 MMHG | RESPIRATION RATE: 18 BRPM

## 2019-01-09 DIAGNOSIS — G43.909 MIGRAINE WITHOUT STATUS MIGRAINOSUS, NOT INTRACTABLE, UNSPECIFIED MIGRAINE TYPE: Primary | ICD-10-CM

## 2019-01-09 DIAGNOSIS — F41.1 GENERALIZED ANXIETY DISORDER: ICD-10-CM

## 2019-01-09 PROCEDURE — 99214 OFFICE O/P EST MOD 30 MIN: CPT | Performed by: NURSE PRACTITIONER

## 2019-01-09 RX ORDER — ALPRAZOLAM 0.25 MG
0.25 TABLET ORAL PRN
COMMUNITY
Start: 2016-04-13 | End: 2019-01-09

## 2019-01-09 RX ORDER — NORTRIPTYLINE HYDROCHLORIDE 50 MG/1
100 CAPSULE ORAL DAILY
Qty: 60 CAPSULE | Refills: 5 | Status: SHIPPED | OUTPATIENT
Start: 2019-01-09 | End: 2019-06-19

## 2019-01-09 RX ORDER — ALPRAZOLAM 0.25 MG
TABLET ORAL
Qty: 10 TABLET | Refills: 2 | Status: SHIPPED | OUTPATIENT
Start: 2019-01-09

## 2019-01-09 RX ORDER — SUMATRIPTAN 25 MG/1
25-50 TABLET, FILM COATED ORAL
Qty: 9 TABLET | Status: SHIPPED | OUTPATIENT
Start: 2019-01-09 | End: 2020-01-09

## 2019-01-09 RX ORDER — NORTRIPTYLINE HCL 25 MG
75 CAPSULE ORAL DAILY
COMMUNITY
Start: 2016-02-09 | End: 2019-01-09

## 2019-01-09 ASSESSMENT — PATIENT HEALTH QUESTIONNAIRE - PHQ9
SUM OF ALL RESPONSES TO PHQ QUESTIONS 1-9: 20
5. POOR APPETITE OR OVEREATING: MORE THAN HALF THE DAYS

## 2019-01-09 ASSESSMENT — ANXIETY QUESTIONNAIRES
7. FEELING AFRAID AS IF SOMETHING AWFUL MIGHT HAPPEN: NEARLY EVERY DAY
GAD7 TOTAL SCORE: 18
2. NOT BEING ABLE TO STOP OR CONTROL WORRYING: NEARLY EVERY DAY
1. FEELING NERVOUS, ANXIOUS, OR ON EDGE: NEARLY EVERY DAY
5. BEING SO RESTLESS THAT IT IS HARD TO SIT STILL: SEVERAL DAYS
3. WORRYING TOO MUCH ABOUT DIFFERENT THINGS: NEARLY EVERY DAY
IF YOU CHECKED OFF ANY PROBLEMS ON THIS QUESTIONNAIRE, HOW DIFFICULT HAVE THESE PROBLEMS MADE IT FOR YOU TO DO YOUR WORK, TAKE CARE OF THINGS AT HOME, OR GET ALONG WITH OTHER PEOPLE: EXTREMELY DIFFICULT
6. BECOMING EASILY ANNOYED OR IRRITABLE: NEARLY EVERY DAY

## 2019-01-09 NOTE — PROGRESS NOTES
SUBJECTIVE:   Ofelia Huddleston is a 28 year old female who presents to clinic today for the following health issues:      Headaches      Duration: severe headache this morning    She usually gets migraines 2-3 times per year  She did not get headaches for 2 years when she was pregnant and breastfeeding.   Stopped breast feeding .  When she stopped breast feeding her son she had 2 migraine episodes then another one a month later. She wonders if the headache are hormonal. She started taking progesterone cream first half of cycle.     Description    The headache this morning was the worst headache of her life.  It came on quickly.   She had a recurring aura with a wider area of loss of vision.   Location: bilateral in the frontal area and radiating down into left cheek bone  Duration:  4 hours     Intensity:  Severe 10/10    Accompanying signs and symptoms:    Precipitating or Alleviating factors:  Nausea/vomiting: no  Dizziness: feeling off balance   No tinnitus  Weakness or numbness: no  Visual changes: aura: blocking vision. Aura kept coming back.   Fever: No  Sinus or URI symptoms no     History  Head trauma: no   Family history of migraines: YES- Pt's maternal grandmother   Previous tests for headaches: no  Neurologist evaluations: no  Able to do daily activities when headache present: no  Wake with headaches: no  Daily pain medication use: no  Any changes in: Pt was wondering if that was part of the issue. Severe panic attack Monday     Precipitating or Alleviating factors (light/sound/sleep/caffeine): caffeine     Therapies tried and outcome: Sumatriptan that was , didn't help, Tylenol and Advil     She saw her chiropractor today which seemed to help  Frequent/daily pain medication use: no    She has taken Imitrex in the past, typically 25 mg works well for her.    She currently takes Xanax about every 2 weeks for anxiety.  She is on nortriptyline 75 mg and was told by her psychiatrist in the past  that the dose could be increased to 100 mg. She would like refills of these medications.        Problem list and histories reviewed & adjusted, as indicated.  Additional history: as documented        Reviewed and updated as needed this visit by clinical staff       Reviewed and updated as needed this visit by Provider         ROS:  CONSTITUTIONAL: NEGATIVE for fever, chills, change in weight  EYES: NEGATIVE for vision changes or irritation  ENT/MOUTH: NEGATIVE for ear, mouth and throat problems  RESP: NEGATIVE for significant cough or SOB  CV: NEGATIVE for chest pain, palpitations or peripheral edema  GI: NEGATIVE for nausea, abdominal pain, heartburn, or change in bowel habits  MUSCULOSKELETAL: NEGATIVE for significant arthralgias or myalgia  NEURO: see HPI  PSYCHIATRIC: see HPI    OBJECTIVE:     /67   Pulse 94   Temp 98.1  F (36.7  C) (Oral)   Resp 18   Wt 58.5 kg (129 lb)   LMP 01/03/2019   SpO2 99%   BMI 20.20 kg/m    Body mass index is 20.2 kg/m .  GENERAL: healthy, alert and no distress  EYES: Eyes grossly normal to inspection, PERRL and conjunctivae and sclerae normal  HENT: ear canals and TM's normal, nose and mouth without ulcers or lesions  NECK: no adenopathy, no asymmetry, masses, or scars and thyroid normal to palpation  RESP: lungs clear to auscultation - no rales, rhonchi or wheezes  CV: regular rate and rhythm, normal S1 S2, no S3 or S4, no murmur, click or rub, no peripheral edema and peripheral pulses strong  ABDOMEN: soft, nontender, no hepatosplenomegaly, no masses and bowel sounds normal  MS: no gross musculoskeletal defects noted, no edema  NEURO: Normal strength and tone, sensory exam grossly normal, mentation intact, cranial nerves 2-12 intact, gait normal including heel/toe/tandem walking, Romberg normal and rapid alternating movements normal  PSYCH: mentation appears normal, affect normal        ASSESSMENT/PLAN:             1. Migraine without status migrainosus, not  intractable, unspecified migraine type  Migraine now resolved, but ws the worst headache of her life.  Discussed imaging, but exam is normal, no other red flag symptoms, so she declines at this time.  Refill of Imitrex given. Discussed taking with Aleve.  Follow up if symptoms recur.  - SUMAtriptan (IMITREX) 25 MG tablet; Take 1-2 tablets (25-50 mg) by mouth at onset of headache for migraine  Dispense: 9 tablet; Refill: PRN    2. Generalized anxiety disorder  Will increase nortriptyline to 100 mg.  Follow up with PCP in one month.  Refill of Xanax given, takes very sparingly.   - nortriptyline (PAMELOR) 50 MG capsule; Take 2 capsules (100 mg) by mouth daily  Dispense: 60 capsule; Refill: 5  - ALPRAZolam (XANAX) 0.25 MG tablet; Take one tablet as needed for anxiety, not more than 1-2 times per week  Dispense: 10 tablet; Refill: 2        Diane Gomez NP  [facility]

## 2019-01-09 NOTE — TELEPHONE ENCOUNTER
Reason for call:  Patient reporting a symptom    Symptom or request: migraine    Duration (how long have symptoms been present): started today    Have you been treated for this before? No    Additional comments: Pt would like some advice as this is the worst migraine she states she has ever had.    Phone Number patient can be reached at:  Home number on file [phone] (home)    Best Time:  anytime    Can we leave a detailed message on this number:  YES    Call taken on 1/9/2019 at 10:40 AM by Edda Allen

## 2019-01-09 NOTE — TELEPHONE ENCOUNTER
11/10/16 was last OV with Dr. Luciano.    Pt has migraines a few times per year.  This am, pt had sudden migraine.  Had aura- blank spot in vision. Started at 0845.    Pt took 3 ibuprofen, 2 sumatriptan, 1 tylenol, 1 bottle of coke.  Ice pack to head and resting.    Nothing is helping. I do rec pt be seen in UC/ ER today. This the worst h/a pt has had. PT was receptive to plan.  Pt may be using  sumatriptan.   Pt has not been seen by a provider for migraine for a while.  She made a migraine appt for next week.  LEILANI Merino

## 2019-01-10 ASSESSMENT — ANXIETY QUESTIONNAIRES: GAD7 TOTAL SCORE: 18

## 2019-06-19 DIAGNOSIS — F41.1 GENERALIZED ANXIETY DISORDER: ICD-10-CM

## 2019-06-20 NOTE — TELEPHONE ENCOUNTER
"Requested Prescriptions   Pending Prescriptions Disp Refills     nortriptyline (PAMELOR) 50 MG capsule [Pharmacy Med Name: NORTRIPTYLINE HCL 50 MG CAP] 60 capsule 0     Sig: TAKE 2 CAPSULES (100 MG) BY MOUTH DAILY    Last Written Prescription Date:  1/9/2019  Last Fill Quantity: 60 capsule     ,  # refills: 5   Last Office Visit: 1/9/2019   Future Office Visit:            Tricyclic Agents ( Annual appt and no PHQ9) Passed - 6/19/2019  9:37 AM        Passed - Blood Pressure under 140/90 in past 12 mos     BP Readings from Last 3 Encounters:   01/09/19 111/67   09/07/17 113/60   05/15/17 116/59           Passed - Recent (12 mo) or future (30 days) visit within authorizing provider's specialty     Patient had office visit in the last 12 months or has a visit in the next 30 days with authorizing provider or within the authorizing provider's specialty.  See \"Patient Info\" tab in inbasket, or \"Choose Columns\" in Meds & Orders section of the refill encounter.          Passed - Medication is active on med list        Passed - Patient is age 18 or older        Passed - Patient is not pregnant        Passed - No positive pregnancy test on record in past 12 mos          "

## 2019-06-21 NOTE — TELEPHONE ENCOUNTER
LM to return clinic phone call. Patient is due for anxiety f/u.  MyChart msg sent.       Edda DEMARCO     [facility]

## 2019-06-23 RX ORDER — NORTRIPTYLINE HYDROCHLORIDE 50 MG/1
100 CAPSULE ORAL DAILY
Qty: 60 CAPSULE | Refills: 0 | Status: SHIPPED | OUTPATIENT
Start: 2019-06-23

## 2019-06-23 NOTE — TELEPHONE ENCOUNTER
Medication is being filled for 1 time refill only due to:  Patient needs to be seen because due for f/u.     Signed Prescriptions:                        Disp   Refills    nortriptyline (PAMELOR) 50 MG capsule      60 cap*0        Sig: TAKE 2 CAPSULES (100 MG) BY MOUTH DAILY  Authorizing Provider: GUADALUPE MEYERS  Ordering User: YAEL SERVIN Reception - one month due for f/u office visit

## 2019-08-20 NOTE — TELEPHONE ENCOUNTER
I called and LM for pt to CB to schedule an office visit for future refills. She is due for Pap smear and PHQ-9/MEJIA-7    Marianne Stoner MA

## 2019-10-02 ENCOUNTER — HEALTH MAINTENANCE LETTER (OUTPATIENT)
Age: 29
End: 2019-10-02

## 2019-12-11 ENCOUNTER — TELEPHONE (OUTPATIENT)
Dept: FAMILY MEDICINE | Facility: CLINIC | Age: 29
End: 2019-12-11

## 2019-12-11 NOTE — TELEPHONE ENCOUNTER
Panel Management Review      Patient has the following on her problem list:     Depression / Dysthymia review    Measure:  Needs PHQ-9 score of 4 or less during index window.  Administer PHQ-9 and if score is 5 or more, send encounter to provider for next steps.    5 - 7 month window range: 11/10-3/9    PHQ-9 SCORE 11/29/2016 1/6/2017 1/9/2019   PHQ-9 Total Score - - -   PHQ-9 Total Score MyChart - 14 (Moderate depression) -   PHQ-9 Total Score 9 - 20       If PHQ-9 recheck is 5 or more, route to provider for next steps.    Patient is due for:  PHQ9 and DAP      Composite cancer screening  Chart review shows that this patient is due/due soon for the following None  Summary:    Patient is due/failing the following:   PAP and PHQ9    Action needed:   Patient needs office visit for physical. and Patient needs to do PHQ9.    Type of outreach:    Sent [employer] message.    Questions for provider review:    None                                                                                                                                    Edith Tay, [facility]       Chart routed to Care Team .

## 2019-12-16 ENCOUNTER — HEALTH MAINTENANCE LETTER (OUTPATIENT)
Age: 29
End: 2019-12-16

## 2019-12-20 ASSESSMENT — ANXIETY QUESTIONNAIRES
GAD7 TOTAL SCORE: 21
7. FEELING AFRAID AS IF SOMETHING AWFUL MIGHT HAPPEN: NEARLY EVERY DAY
2. NOT BEING ABLE TO STOP OR CONTROL WORRYING: NEARLY EVERY DAY
3. WORRYING TOO MUCH ABOUT DIFFERENT THINGS: NEARLY EVERY DAY
5. BEING SO RESTLESS THAT IT IS HARD TO SIT STILL: NEARLY EVERY DAY
1. FEELING NERVOUS, ANXIOUS, OR ON EDGE: NEARLY EVERY DAY
6. BECOMING EASILY ANNOYED OR IRRITABLE: NEARLY EVERY DAY

## 2019-12-20 ASSESSMENT — PATIENT HEALTH QUESTIONNAIRE - PHQ9
5. POOR APPETITE OR OVEREATING: NEARLY EVERY DAY
SUM OF ALL RESPONSES TO PHQ QUESTIONS 1-9: 17

## 2019-12-20 NOTE — TELEPHONE ENCOUNTER
Panel Management Review      Patient has the following on her problem list:     Depression / Dysthymia review    Measure:  Needs PHQ-9 score of 4 or less during index window.  Administer PHQ-9 and if score is 5 or more, send encounter to provider for next steps.    5 - 7 month window range: 11/10-3/9    PHQ-9 SCORE 11/29/2016 1/6/2017 1/9/2019   PHQ-9 Total Score - - -   PHQ-9 Total Score MyChart - 14 (Moderate depression) -   PHQ-9 Total Score 9 - 20       If PHQ-9 recheck is 5 or more, route to provider for next steps.    Patient is due for:  PHQ9 and DAP      Composite cancer screening  Chart review shows that this patient is due/due soon for the following None  Summary:    Patient is due/failing the following:   PAP and PHQ9    Action needed:   Patient needs office visit for physical. and Patient needs to do PHQ9.    Type of outreach:    Received phq-9 and aden through care everywhere. Seeing psych with [facility]    Questions for provider review:    None                                                                                                                                    Edith Tay [facility]       Chart routed to Care Team .

## 2019-12-21 ASSESSMENT — ANXIETY QUESTIONNAIRES: GAD7 TOTAL SCORE: 21

## 2020-02-21 ENCOUNTER — TELEPHONE (OUTPATIENT)
Dept: FAMILY MEDICINE | Facility: CLINIC | Age: 30
End: 2020-02-21

## 2020-02-21 NOTE — TELEPHONE ENCOUNTER
Panel Management Review      Patient has the following on her problem list:     Depression / Dysthymia review    Measure:  Needs PHQ-9 score of 4 or less during index window.  Administer PHQ-9 and if score is 5 or more, send encounter to provider for next steps.    5 - 7 month window range:     PHQ-9 SCORE 1/6/2017 1/9/2019 12/20/2019   PHQ-9 Total Score - - -   PHQ-9 Total Score MyChart 14 (Moderate depression) - -   PHQ-9 Total Score - 20 17       If PHQ-9 recheck is 5 or more, route to provider for next steps.    Patient is due for:  PHQ9      Composite cancer screening  Chart review shows that this patient is due/due soon for the following Pap Smear and None  Summary:    Patient is due/failing the following:   PAP, PHQ9 and PHYSICAL    Action needed:   Patient needs office visit for physical with PAP and Patient needs to do PHQ9.    Type of outreach:    Phone, left message for patient to call back.     Questions for provider review:    None                                                                                                                                    Jonel Turcios MA       Chart routed to Care Team .

## 2020-09-20 ENCOUNTER — VIRTUAL VISIT (OUTPATIENT)
Dept: FAMILY MEDICINE | Facility: OTHER | Age: 30
End: 2020-09-20

## 2020-09-20 ENCOUNTER — NURSE TRIAGE (OUTPATIENT)
Dept: NURSING | Facility: CLINIC | Age: 30
End: 2020-09-20

## 2020-09-20 NOTE — PROGRESS NOTES
"Date: 2020 09:22:59  Clinician: Jhonny Bustamante  Clinician NPI: [id number]  Patient: Ofelia Huddleston  Patient : 1990  Patient Address: [address] [place] [address]  Patient Phone: [phone]  Visit Protocol: URI  Patient Summary:  Ofelia is a 30 year old ( : 1990 ) female who initiated a [facility] Visit for COVID-19 (Coronavirus) evaluation and screening. When asked the question \"Please sign me up to receive news, health information and promotions. \", Ofelia responded \"No\".    Ofelia states her symptoms started 1-2 days ago.   Her symptoms consist of chills, malaise, nasal congestion, a headache, rhinitis, and myalgia. Ofelia also feels feverish.   Symptom details     Nasal secretions: The color of her mucus is green.    Temperature: Her current temperature is 99.3 degrees Fahrenheit.     Headache: She states the headache is moderate (4-6 on a 10 point pain scale).      Ofelia denies having facial pain or pressure, sore throat, teeth pain, ageusia, diarrhea, cough, ear pain, anosmia, vomiting, nausea, and wheezing. She also denies having a sinus infection within the past year, taking antibiotic medication in the past month, and having recent facial or sinus surgery in the past 60 days. She is not experiencing dyspnea.   Precipitating events  She has not recently been exposed to someone with influenza. Ofelia has been in close contact with the following high risk individuals: children under the age of 5 and pregnant women.   Pertinent COVID-19 (Coronavirus) information  In the past 14 days, Ofelia has not worked in a congregate living setting.   She does not work or volunteer as healthcare worker or a  and does not work or volunteer in a healthcare facility.   Ofelia also has not lived in a congregate living setting in the past 14 days. She does not live with a healthcare worker.   Ofelia has not had a close contact with a laboratory-confirmed " COVID-19 patient within 14 days of symptom onset.   Since December 2019, Ofelia and has not had upper respiratory infection or influenza-like illness. Has not been diagnosed with lab-confirmed COVID-19 test   Pertinent medical history  Ofelia does not get yeast infections when she takes antibiotics.   Ofelia needs a return to work/school note.   Weight: 125 lbs   Ofelia does not smoke or use smokeless tobacco.   She is not sure if she is pregnant and denies breastfeeding. She has menstruated in the past month.   Weight: 125 lbs    MEDICATIONS: No current medications, ALLERGIES: NKDA  Clinician Response:  Dear Ofelia,  Based on the information provided, you have a viral upper respiratory infection, otherwise known as a cold. Symptoms vary from person to person, but can include sneezing, coughing, a runny nose, sore throat, and headache and range from mild to severe.  Unfortunately, there are no medications that can cure a cold, so treatment is focused on controlling symptoms as much as possible. Most people gradually feel better until symptoms are gone in 1-2 weeks.  Medication information  Because you have a viral infection, antibiotics will not help you get better. Treating a viral infection with antibiotics could actually make you feel worse.  For more information on why I am not prescribing antibiotics, please watch this video: Antibiotics Aren't Always the Answer.  Unless you are allergic to the over-the-counter medication(s) below, I recommend using:       Acetaminophen (Tylenol or store brand) oral tablet. Take 1-2 tablets by mouth every 4-6 hours to help with the discomfort.    A decongestant such as Sudafed PE or store brand.     Over-the-counter medications do not require a prescription. Ask the pharmacist if you have any questions.  Self care  Steps you can take to be as comfortable as possible:     Rest.    Drink plenty of fluids.    Take a warm shower to loosen congestion    Use a cool-mist  humidifier.     When to seek care  Please be seen in a clinic or urgent care if any of the following occur:   New symptoms develop, or symptoms become worse   Call ahead before going to the clinic or urgent care.  Additional treatment plan   advise home quarantine over the next 7 days. consider virus testing in 2 or 3 days time if symptoms persist. if worsening go to the urgent care. Do not return to school or work until your fever has subsided and symptoms have resolved x 48 hours.&nbsp;  Should be in home quarantine over the next 7 days.&nbsp;    COVID-19 (Coronavirus) General Information  Because there is currently no vaccine to prevent infection, the best way to protect yourself is to avoid being exposed to this virus. Common symptoms of COVID-19 include but are not limited to fever, cough, and shortness of breath. These symptoms appear 2-14 days after you are exposed to the virus that causes COVID-19. Click here for more information from the CDC on how to protect yourself.  If you are sick with COVID-19 or suspect you are infected with the virus that causes COVID-19, follow the steps here from the CDC to help prevent the disease from spreading to people in your home and community.  Click here for general information from the CDC on testing.  If you develop any of these emergency warning signs for COVID-19, get medical attention immediately:     Trouble breathing    Persistent pain or pressure in the chest    New confusion or inability to arouse    Bluish lips or face      Call your doctor or clinic before going in. Call 911 if you have a medical emergency and notify the  you have or think you may have COVID-19.  For more detailed and up to date information on COVID-19 (Coronavirus), please visit the CDC website.   Diagnosis: Nasal congestion  Diagnosis ICD: R09.81

## 2020-09-20 NOTE — TELEPHONE ENCOUNTER
Ofelia reports that she did [facility] and was not recommended for COVID testing, asks if RN can amend the visit note. Did a free COVID [facility] virtual visit. Reports having a low grade fever and chills, also a runny nose which has been going on for 2 days now. She states that there are testing schedules available in [place] today.     Asks if RN can authorize a COVID testing, FNA said no. RN informed her that she may have to go through [facility] again or do virtual UC visit. She will try [facility] again and try to add further information.    Tennille Blair RN/[place] Nurse Advisor

## 2020-09-20 NOTE — PROGRESS NOTES
"Date: 2020 09:49:46  Clinician: Guillermo Salinas  Clinician NPI: [id number]  Patient: Ofelia Huddleston  Patient : 1990  Patient Address: [address] [place] [address]  Patient Phone: [phone]  Visit Protocol: URI  Patient Summary:  Ofelia is a 30 year old ( : 1990 ) female who initiated a [facility] Visit for COVID-19 (Coronavirus) evaluation and screening. When asked the question \"Please sign me up to receive news, health information and promotions. \", Ofelia responded \"No\".    Ofelia states her symptoms started 1-2 days ago.   Her symptoms consist of chills, malaise, nasal congestion, a headache, rhinitis, nausea, and myalgia. Ofelia also feels feverish.   Symptom details     Nasal secretions: The color of her mucus is clear and green.    Temperature: Her current temperature is 99.3 degrees Fahrenheit.     Headache: She states the headache is moderate (4-6 on a 10 point pain scale).      Ofelia denies having facial pain or pressure, sore throat, teeth pain, ageusia, diarrhea, cough, ear pain, anosmia, vomiting, and wheezing. She also denies having a sinus infection within the past year, taking antibiotic medication in the past month, and having recent facial or sinus surgery in the past 60 days. She is not experiencing dyspnea.   Precipitating events  She has not recently been exposed to someone with influenza. Ofelia has been in close contact with the following high risk individuals: children under the age of 5 and pregnant women.   Pertinent COVID-19 (Coronavirus) information  In the past 14 days, Ofelia has not worked in a congregate living setting.   She does not work or volunteer as healthcare worker or a  and does not work or volunteer in a healthcare facility.   Ofelia also has not lived in a congregate living setting in the past 14 days. She does not live with a healthcare worker.   Ofelia has not had a close contact with a " "laboratory-confirmed COVID-19 patient within 14 days of symptom onset.   Since December 2019, Ofelia and has not had upper respiratory infection or influenza-like illness. Has not been diagnosed with lab-confirmed COVID-19 test   Pertinent medical history  Ofelia does not get yeast infections when she takes antibiotics.   Ofelia needs a return to work/school note.   Weight: 125 lbs   Ofelia does not smoke or use smokeless tobacco.   She is not sure if she is pregnant and denies breastfeeding. She has menstruated in the past month.   Additional information as reported by the patient (free text): I don't feel \"short of breath\" per se, but it is difficult to take a deep belly breath. Symptoms were sudden and started yesterday. I've heard that colds are normally gradual, and not sudden like mine.   Weight: 125 lbs  Reason for repeat visit for the same protocol within 24 hours:  I am concerned about having COVID, and would like to get tested today. The order for the previous [facility] visit indicated that I could not get tested for 2-3 days. My son (2yo) and I both have symptoms, and he attends . I cannot afford to wait additional days before getting tested, as I will lose that  money for him not attending, along with work time for myself and my .  See the History of referred by protocol and completed visits section for details on previous visits (visits currently in queue to be diagnosed will not appear in this section).    MEDICATIONS: No current medications, ALLERGIES: NKDA  Clinician Response:  Dear Ofelia,   Your symptoms show that you may have coronavirus (COVID-19). This illness can cause fever, cough and trouble breathing. Many people get a mild case and get better on their own. Some people can get very sick.  What should I do?  We would like to test you for this virus.   1. Please call [phone] to schedule your visit. Explain that you were referred by [facility] to have a COVID-19 test. Be " "ready to share your [facility] visit ID number.  The following will serve as your written order for this COVID Test, ordered by me, for the indication of suspected COVID [Z20.828]: The test will be ordered in [employer], our electronic health record, after you are scheduled. It will show as ordered and authorized by Tony Cortés MD.  Order: COVID-19 (Coronavirus) PCR [facility].      2. When it's time for your COVID test:  Stay at least 6 feet away from others. (If someone will drive you to your test, stay in the backseat, as far away from the  as you can.)   Cover your mouth and nose with a mask, tissue or washcloth.  Go straight to the testing site. Don't make any stops on the way there or back.      3.Starting now: Stay home and away from others (self-isolate) until:   You've had no fever---and no medicine that reduces fever---for one full day (24 hours). And...   Your other symptoms have gotten better. For example, your cough or breathing has improved. And...   At least 10 days have passed since your symptoms started.       During this time, don't leave the house except for testing or medical care.   Stay in your own room, even for meals. Use your own bathroom if you can.   Stay away from others in your home. No hugging, kissing or shaking hands. No visitors.  Don't go to work, school or anywhere else.    Clean \"high touch\" surfaces often (doorknobs, counters, handles, etc.). Use a household cleaning spray or wipes. You'll find a full list of  on the EPA website: www.epa.gov/pesticide-registration/list-n-disinfectants-use-against-sars-cov-2.   Cover your mouth and nose with a mask, tissue or washcloth to avoid spreading germs.  Wash your hands and face often. Use soap and water.  Caregivers in these groups are at risk for severe illness due to COVID-19:  o People 65 years and older  o People who live in a nursing home or long-term care facility  o People with chronic disease (lung, " heart, cancer, diabetes, kidney, liver, immunologic)  o People who have a weakened immune system, including those who:   Are in cancer treatment  Take medicine that weakens the immune system, such as corticosteroids  Had a bone marrow or organ transplant  Have an immune deficiency  Have poorly controlled HIV or AIDS  Are obese (body mass index of 40 or higher)  Smoke regularly   o Caregivers should wear gloves while washing dishes, handling laundry and cleaning bedrooms and bathrooms.  o Use caution when washing and drying laundry: Don't shake dirty laundry, and use the warmest water setting that you can.  o For more tips, go to www.cdc.gov/coronavirus/2019-ncov/downloads/10Things.pdf.    4.Sign up for [employer]. We know it's scary to hear that you might have COVID-19. We want to track your symptoms to make sure you're okay over the next 2 weeks. Please look for an email from [employer]---this is a free, online program that we'll use to keep in touch. To sign up, follow the link in the email. Learn more at [email or web address]  How can I take care of myself?   Get lots of rest. Drink extra fluids (unless a doctor has told you not to).   Take Tylenol (acetaminophen) for fever or pain. If you have liver or kidney problems, ask your family doctor if it's okay to take Tylenol.   Adults can take either:    650 mg (two 325 mg pills) every 4 to 6 hours, or...   1,000 mg (two 500 mg pills) every 8 hours as needed.    Note: Don't take more than 3,000 mg in one day. Acetaminophen is found in many medicines (both prescribed and over-the-counter medicines). Read all labels to be sure you don't take too much.   For children, check the Tylenol bottle for the right dose. The dose is based on the child's age or weight.    If you have other health problems (like cancer, heart failure, an organ transplant or severe kidney disease): Call your specialty clinic if you don't feel better in the next 2 days.       Know  when to call 911. Emergency warning signs include:    Trouble breathing or shortness of breath Pain or pressure in the chest that doesn't go away Feeling confused like you haven't felt before, or not being able to wake up Bluish-colored lips or face.  Where can I get more information?   [facility] -- About COVID-19: [email or web address]   CDC -- What to Do If You're Sick: www.cdc.gov/coronavirus/2019-ncov/about/steps-when-sick.html   CDC -- Ending Home Isolation: www.cdc.gov/coronavirus/2019-ncov/hcp/disposition-in-home-patients.html   [facility] -- Caring for Someone: www.cdc.gov/coronavirus/2019-ncov/if-you-are-sick/care-for-someone.html   [facility] -- Interim Guidance for Hospital Discharge to Home: www.[facility].[place]/diseases/coronavirus/hcp/hospdischarge.pdf   [facility] clinical trials (COVID-19 research studies): clinicalaffairs.[facility]-clinical-trials    Below are the COVID-19 hotlines at the [place] Department of Health ([facility]). Interpreters are available.    For health questions: Call 980-519-0211 or 1-554.265.7065 (7 a.m. to 7 p.m.) For questions about schools and childcare: Call 734-423-3596 or 1-290.189.6401 (7 a.m. to 7 p.m.)    Diagnosis: Nasal congestion  Diagnosis ICD: R09.81

## 2020-12-23 ENCOUNTER — VIRTUAL VISIT (OUTPATIENT)
Dept: HEMATOLOGY | Facility: CLINIC | Age: 30
End: 2020-12-23
Attending: INTERNAL MEDICINE
Payer: COMMERCIAL

## 2020-12-23 DIAGNOSIS — D68.59: Primary | ICD-10-CM

## 2020-12-23 PROCEDURE — 99203 OFFICE O/P NEW LOW 30 MIN: CPT | Mod: 95 | Performed by: INTERNAL MEDICINE

## 2020-12-23 RX ORDER — MULTIVITAMIN WITH IRON
1 TABLET ORAL DAILY
COMMUNITY

## 2020-12-23 RX ORDER — QUETIAPINE FUMARATE 50 MG/1
50 TABLET, FILM COATED ORAL 2 TIMES DAILY
COMMUNITY

## 2020-12-23 RX ORDER — MULTIVITAMIN,THERAPEUTIC
1 TABLET ORAL DAILY
COMMUNITY

## 2020-12-23 NOTE — NURSING NOTE
Patient was contacted to complete the pre-visit call prior to their video visit with the provider.  The following statement was read:       This visit will be billed to your insurance the same as an in-person visit. Because of Coronavirus we are instituting video visits when possible to keep everyone safe. This video visit will be conducted between you and the provider.  This service lets us provide the care you need with a video conversation.  If a prescription is necessary, we can send it directly to your pharmacy.If lab work or other testing is needed, we can help arrange a place/time for that to be done at a later date.If during the course of the call the provider feels a video visit is not appropriate, then your insurance company will not be billed.       Allergies and medications were reviewed and travel screening complete.     I thanked them for their time to cover this information     Chitra Marroquin

## 2020-12-29 NOTE — PROGRESS NOTES
[facility]  [address] [place] [address] [address] [address]  Main: [phone], Fax: [phone]    Patient seen at: [facility] at [address] [place] [address]    Video Virtual Visit Note:    Patient: Ofelia Huddleston  MRN: [id number]  : 1990  GÓMEZ: Dec 23, 2020     Due to the ongoing COVID-19 outbreak, this visit was conducted by video, with the patient's approval.    Physician has received verbal consent for a Video Visit from the patient? Yes    Video Start Time: 4:48 PM by Heavenly  Video End Time: 4:59 PM due to audio issues, completed by telephone at 5:20 PM    Reason for Consultation:  Ofelia Huddleston is self-referred  for evaluation and treatment of management of Protein C deficiency during pregnancy.    History of Present Illness:  Ofelia Huddleston is a 30 year old woman with a history of protein C deficiency, with a baseline activity of 44%, who presents today for counseling around management of pregnancy.  I saw her in 2017 because she was attempting to conceive.  At that point, I recommended no prophylactic anticoagulation given her lack of venous thromboembolic events.    Today, she reports she had no complications with her first pregnancy.  She did not use enoxaparin prophylaxis during the pregnancy.  She continues to report no personal history of venous thromboembolic events.  Her mother who also has protein C deficiency (this was discovered in her maternal aunt who was found to have protein C deficiency associated with VTE), also has never had a history of venous thrombosis and never had pregnancy complications.    Overall, Ofelia reports she is feeling well today with no complaints.    Past Medical History:  Past Medical History:   Diagnosis Date     Heterozygous protein C deficiency (H) 9/10/2014     Mental disorder     depression and anxiety     NO ACTIVE PROBLEMS (aka NONE)        Past Surgical History:  Past  Surgical History:   Procedure Laterality Date     TONSILLECTOMY  2006       Medications:  Current Outpatient Medications   Medication Sig     doxylamine (UNISOM) 25 MG TABS tablet Take 12.5 mg by mouth At Bedtime     magnesium 250 MG tablet Take 1 tablet by mouth daily     multivitamin, therapeutic (THERA-VIT) TABS tablet Take 1 tablet by mouth daily     QUEtiapine (SEROQUEL) 50 MG tablet Take 50 mg by mouth 2 times daily     ALPRAZolam (XANAX) 0.25 MG tablet Take one tablet as needed for anxiety, not more than 1-2 times per week     nortriptyline (PAMELOR) 50 MG capsule TAKE 2 CAPSULES (100 MG) BY MOUTH DAILY     No current facility-administered medications for this visit.        Allergies:  Allergies   Allergen Reactions     Seasonal Allergies        ROS:  Denies any bleeding issues. No gum bleeding, No nose bleed. Denies any hematuria or blood in stools. Denies any ecchymosis. Denies any lower extremities swelling or pain. Denies any fever, no chest pain. Denies any shortness of breath.       Social History:  Denies any tobacco use.    Family History:  As above, protein C deficiency was first discovered in her maternal aunt, who also had a VTE    Objective:  Constitutional: Appears well, no distress  Eyes: no discharge, injection or icterus  Respiratory: no cough or labored breathing  Skin: no rashes or petechiae  Neurological: no deficits appreciated, speech is fluent  Psych: affect is normal    Labs:  No new labs    Imaging:  No new imaging    Assessment:  In summary, Ofelia Huddleston is a 30 year old woman with protein C deficiency, without a personal history of venous thrombosis, personal history of fetal loss or pregnancy associated thrombotic event, or history of either of these in her mother.  Overall, there is quite a degree of phenotypic variability in patients with routine C deficiency.  We do see family is that have no venous thrombosis despite heterozygosity for protein C deficiency, and given that  Gisselle has had a successful pregnancy without a venous thrombosis, my assessment is that she is continued sufficiently low risk to forego any sort of prophylaxis during the pregnancy.  I did explain that her highest risk for VTE would be postpartum, and she could consider taking prophylactic Lovenox, 40 mg daily, for the 6 weeks postpartum, which would be the most conservative approach.    Plan:  1. Majority of today's visit was spent counseling the patient regarding treatment and prognosis of protein C deficiency.  2.  Based on her personal and family history of VTE, which is much more telling in terms of phenotype, then her degree of protein C deficiency, I recommended that no enoxaparin prophylaxis is required during this pregnancy.  3.  She could consider postpartum prophylaxis with 6 weeks of enoxaparin but this is not mandatory.  She should call the clinic if she wishes to proceed with this therapy.    The patient is given our center's contact information and is instructed to call if she should have any further questions or concerns.      Elmo Paris MD   of Medicine  [facility] [employer]

## 2021-01-15 ENCOUNTER — HEALTH MAINTENANCE LETTER (OUTPATIENT)
Age: 31
End: 2021-01-15

## 2021-09-04 ENCOUNTER — HEALTH MAINTENANCE LETTER (OUTPATIENT)
Age: 31
End: 2021-09-04

## 2022-02-19 ENCOUNTER — HEALTH MAINTENANCE LETTER (OUTPATIENT)
Age: 32
End: 2022-02-19

## 2022-07-02 ENCOUNTER — HOSPITAL ENCOUNTER (EMERGENCY)
Facility: CLINIC | Age: 32
Discharge: HOME | End: 2022-07-02
Attending: EMERGENCY MEDICINE | Admitting: EMERGENCY MEDICINE
Payer: COMMERCIAL

## 2022-07-02 ENCOUNTER — APPOINTMENT (OUTPATIENT)
Dept: GENERAL RADIOLOGY | Facility: CLINIC | Age: 32
End: 2022-07-02
Attending: EMERGENCY MEDICINE
Payer: COMMERCIAL

## 2022-07-02 VITALS
SYSTOLIC BLOOD PRESSURE: 109 MMHG | TEMPERATURE: 98 F | DIASTOLIC BLOOD PRESSURE: 67 MMHG | OXYGEN SATURATION: 97 % | HEART RATE: 93 BPM | RESPIRATION RATE: 18 BRPM

## 2022-07-02 DIAGNOSIS — T17.908A ASPIRATION INTO AIRWAY, INITIAL ENCOUNTER: ICD-10-CM

## 2022-07-02 PROCEDURE — 99283 EMERGENCY DEPT VISIT LOW MDM: CPT | Mod: 25

## 2022-07-02 PROCEDURE — 71046 X-RAY EXAM CHEST 2 VIEWS: CPT

## 2022-07-02 RX ORDER — BENZONATATE 100 MG/1
100 CAPSULE ORAL 3 TIMES DAILY PRN
Qty: 15 CAPSULE | Refills: 0 | Status: SHIPPED | OUTPATIENT
Start: 2022-07-02

## 2022-07-02 ASSESSMENT — ENCOUNTER SYMPTOMS
TROUBLE SWALLOWING: 0
COUGH: 1
SORE THROAT: 1

## 2022-07-02 NOTE — ED TRIAGE NOTES
Pt arrives with c/o possible aspiration of a sunflower seed at lunch yesterday. Pt reports she choked on the seed and had a coughing attack but didn't really feel like anything moved. Pt reports it still feels stuck in her mid esophagus. Pt reports she had another coughing attack this morning and reports that her throat feels sore.      Triage Assessment     Row Name 07/02/22 0914       Triage Assessment (Adult)    Airway WDL WDL       Respiratory WDL    Respiratory WDL X;cough       Skin Circulation/Temperature WDL    Skin Circulation/Temperature WDL WDL       Cardiac WDL    Cardiac WDL WDL       Peripheral/Neurovascular WDL    Peripheral Neurovascular WDL WDL       Cognitive/Neuro/Behavioral WDL    Cognitive/Neuro/Behavioral WDL WDL

## 2022-07-02 NOTE — ED PROVIDER NOTES
History   Chief Complaint:  Possible Aspiration       HPI   Ofelia Huddleston is a 31 year old female who presents after a possible episode of aspiration. Yesterday, about 20 hours ago, she was eating lunch when she reports she felt like she inhaled something. Following this, she had a coughing fit, but did not feel like what she inhaled had moved in her throat. Last night she reports being okay, however, when she woke up this morning she had another episode of coughing which was accompanied by a sore throat and swelling. Today she reports pain while swallowing and some discomfort at the upper sternal notch. However, she does report she has been able to drink water okay.     Review of Systems   HENT: Positive for sore throat. Negative for trouble swallowing.    Respiratory: Positive for cough.    All other systems reviewed and are negative.        Allergies:  Nortriptyline    Medications:  Magnesium  Gabapentin  Hydroxyzine  Lorazepam  Sumatriptan   Desvenlafaxine succinate   Bupropion   Alprazolam   Nortriptyline   Quetiapine  Doxylamine   Buspirone    Past Medical History:     Gluten intolerance  Heterozygous protein C deficiency  OCD  Depression   Anxiety   persistent headaches   Urticaria   Adjustment insomnia  Panic disorder with agoraphobia    Past Surgical History:    Tonsillectomy   [place] teeth extraction    Family History:    Brother: cancer    Social History:  The patient presents to the ED by means of car.   The patient presents to the ED by herself.     Physical Exam     Patient Vitals for the past 24 hrs:   BP Temp Temp src Pulse Resp SpO2   07/02/22 1045 109/67 -- -- 93 -- 97 %   07/02/22 1030 116/72 -- -- 94 -- 100 %   07/02/22 0913 121/70 98  F (36.7  C) Temporal 104 18 100 %     Physical Exam  Vitals reviewed.   HENT:      Nose: Nose normal.      Mouth/Throat:      Mouth: Mucous membranes are moist.   Eyes:      Pupils: Pupils are equal, round, and reactive to light.   Cardiovascular:      Rate  and Rhythm: Normal rate and regular rhythm.   Pulmonary:      Effort: Pulmonary effort is normal.      Breath sounds: No wheezing or rhonchi.   Abdominal:      General: Abdomen is flat.      Palpations: Abdomen is soft.   Skin:     Capillary Refill: Capillary refill takes less than 2 seconds.   Neurological:      General: No focal deficit present.      Mental Status: She is alert.           Emergency Department Course     Imaging:  Chest XR,  PA & LAT   Final Result   IMPRESSION: Negative chest.        Report per radiology    Emergency Department Course:     Reviewed:  I reviewed nursing notes, vitals, past medical history and Care Everywhere    Assessments:  1027 I obtained history and examined the patient as noted above.   1035 I rechecked the patient and explained findings.     Disposition:  The patient was discharged to home.     Impression & Plan       Medical Decision Making:  Patient presents with foreign body sensation in the mid trachea after possibly choking on a seat yesterday.  Patient is well-appearing without signs of hypoxia or tachypnea is more than 12 hours after aspiration.  X-rays negative for acute findings.  Discussed with patient unlikely sunflowers would show up on a radiographic film did consider CT scan at her age but due to the risk of radiology suspect that foreign body if there would be on attributable different differential diagnosis includes oropharyngeal abrasion patient does not have stridor no concerns of oropharyngeal foreign body patient was offered reassurance and discharged home    Diagnosis:    ICD-10-CM    1. Aspiration into airway, initial encounter  T17.908A        Discharge Medications:  New Prescriptions    BENZONATATE (TESSALON) 100 MG CAPSULE    Take 1 capsule (100 mg) by mouth 3 times daily as needed for cough       Scribe Disclosure:  I, Joshua Kjer, adams serving as a scribe at 10:14 AM on 7/2/2022 to document services personally performed by Omid Mars MD  based on my observations and the provider's statements to me.            Omid Mars MD  07/05/22 [id number]

## 2022-07-02 NOTE — DISCHARGE INSTRUCTIONS
We have evaluated you with an x-ray and this is normal.  As we have discussed x-rays do not commonly identify organic material in your airway.  However your lung sounds are clear your oxygen levels are normal.  Since its been more almost 24 hours since you aspirated the seed we do not recommend an emergent transfer to another hospital for bronchoscopy or aggressive intervention to look in your airway.  Please use Tessalon Perles for coughing.  Return to the emergency room with difficulty with worsening difficulty swallowing increasing shortness of breath or fever greater than 100.4

## 2022-10-22 ENCOUNTER — HEALTH MAINTENANCE LETTER (OUTPATIENT)
Age: 32
End: 2022-10-22

## 2023-04-01 ENCOUNTER — HEALTH MAINTENANCE LETTER (OUTPATIENT)
Age: 33
End: 2023-04-01

## 2024-06-02 ENCOUNTER — HEALTH MAINTENANCE LETTER (OUTPATIENT)
Age: 34
End: 2024-06-02

## 2025-06-15 ENCOUNTER — HEALTH MAINTENANCE LETTER (OUTPATIENT)
Age: 35
End: 2025-06-15